# Patient Record
Sex: FEMALE | Race: WHITE | NOT HISPANIC OR LATINO | URBAN - METROPOLITAN AREA
[De-identification: names, ages, dates, MRNs, and addresses within clinical notes are randomized per-mention and may not be internally consistent; named-entity substitution may affect disease eponyms.]

---

## 2023-03-20 PROBLEM — M25.50 ARTHRALGIA OF MULTIPLE SITES: Status: ACTIVE | Noted: 2023-03-20

## 2023-03-20 PROBLEM — E78.5 HYPERLIPIDEMIA: Status: ACTIVE | Noted: 2023-03-20

## 2023-03-20 PROBLEM — F43.10 PTSD (POST-TRAUMATIC STRESS DISORDER): Status: ACTIVE | Noted: 2023-03-20

## 2023-03-20 PROBLEM — S43.006A SHOULDER DISLOCATION: Status: ACTIVE | Noted: 2023-03-20

## 2023-03-20 PROBLEM — M19.90 OSTEOARTHRITIS: Status: ACTIVE | Noted: 2023-03-20

## 2023-03-20 PROBLEM — I34.0 MITRAL INSUFFICIENCY: Status: ACTIVE | Noted: 2023-03-20

## 2023-03-20 PROBLEM — E55.9 VITAMIN D DEFICIENCY: Status: ACTIVE | Noted: 2023-03-20

## 2023-03-20 PROBLEM — R03.0 BLOOD PRESSURE ELEVATED WITHOUT HISTORY OF HTN: Status: ACTIVE | Noted: 2023-03-20

## 2023-03-20 PROBLEM — R05.9 COUGH: Status: ACTIVE | Noted: 2023-03-20

## 2023-03-20 PROBLEM — R07.9 CHEST PAIN: Status: ACTIVE | Noted: 2023-03-20

## 2023-03-20 PROBLEM — R19.7 DIARRHEA: Status: ACTIVE | Noted: 2023-03-20

## 2023-03-20 PROBLEM — H91.90 HEARING DIFFICULTY: Status: ACTIVE | Noted: 2023-03-20

## 2023-03-20 PROBLEM — F41.9 ANXIETY DISORDER: Status: ACTIVE | Noted: 2023-03-20
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
## 2023-03-20 PROBLEM — R01.1 HEART MURMUR: Status: ACTIVE | Noted: 2023-03-20

## 2023-03-20 PROBLEM — M54.9 BACK PAIN: Status: ACTIVE | Noted: 2023-03-20

## 2023-03-20 RX ORDER — BUSPIRONE HYDROCHLORIDE 5 MG/1
1 TABLET ORAL 2 TIMES DAILY
COMMUNITY
Start: 2021-07-07 | End: 2023-09-11 | Stop reason: ALTCHOICE

## 2023-03-20 RX ORDER — HYDROXYZINE HYDROCHLORIDE 10 MG/1
10-20 TABLET, FILM COATED ORAL 2 TIMES DAILY PRN
COMMUNITY
Start: 2021-06-09 | End: 2023-09-11 | Stop reason: ALTCHOICE

## 2023-03-21 ENCOUNTER — APPOINTMENT (OUTPATIENT)
Dept: PRIMARY CARE | Facility: CLINIC | Age: 68
End: 2023-03-21
Payer: MEDICARE

## 2023-03-23 ENCOUNTER — OFFICE VISIT (OUTPATIENT)
Dept: PRIMARY CARE | Facility: CLINIC | Age: 68
End: 2023-03-23
Payer: MEDICARE

## 2023-03-23 VITALS
BODY MASS INDEX: 21.66 KG/M2 | OXYGEN SATURATION: 99 % | TEMPERATURE: 97.4 F | HEART RATE: 65 BPM | HEIGHT: 65 IN | SYSTOLIC BLOOD PRESSURE: 169 MMHG | DIASTOLIC BLOOD PRESSURE: 74 MMHG | RESPIRATION RATE: 14 BRPM | WEIGHT: 130 LBS

## 2023-03-23 DIAGNOSIS — N63.23 MASS OF LOWER OUTER QUADRANT OF LEFT BREAST: Primary | ICD-10-CM

## 2023-03-23 DIAGNOSIS — F41.8 OTHER SPECIFIED ANXIETY DISORDERS: ICD-10-CM

## 2023-03-23 DIAGNOSIS — E55.9 VITAMIN D DEFICIENCY: ICD-10-CM

## 2023-03-23 DIAGNOSIS — R03.0 BLOOD PRESSURE ELEVATED WITHOUT HISTORY OF HTN: ICD-10-CM

## 2023-03-23 DIAGNOSIS — M19.011 OSTEOARTHRITIS OF BOTH SHOULDERS, UNSPECIFIED OSTEOARTHRITIS TYPE: ICD-10-CM

## 2023-03-23 DIAGNOSIS — E78.2 MIXED HYPERLIPIDEMIA: ICD-10-CM

## 2023-03-23 DIAGNOSIS — M19.012 OSTEOARTHRITIS OF BOTH SHOULDERS, UNSPECIFIED OSTEOARTHRITIS TYPE: ICD-10-CM

## 2023-03-23 PROCEDURE — 99215 OFFICE O/P EST HI 40 MIN: CPT | Performed by: INTERNAL MEDICINE

## 2023-03-23 PROCEDURE — 1036F TOBACCO NON-USER: CPT | Performed by: INTERNAL MEDICINE

## 2023-03-23 PROCEDURE — 1160F RVW MEDS BY RX/DR IN RCRD: CPT | Performed by: INTERNAL MEDICINE

## 2023-03-23 PROCEDURE — 1159F MED LIST DOCD IN RCRD: CPT | Performed by: INTERNAL MEDICINE

## 2023-03-23 RX ORDER — ALPRAZOLAM 0.25 MG/1
TABLET ORAL
Qty: 6 TABLET | Refills: 0 | Status: SHIPPED | OUTPATIENT
Start: 2023-03-23 | End: 2023-10-17 | Stop reason: SDUPTHER

## 2023-03-23 ASSESSMENT — ENCOUNTER SYMPTOMS
PALPITATIONS: 0
RESPIRATORY NEGATIVE: 1
SEIZURES: 0
WOUND: 0
SINUS PRESSURE: 0
EYES NEGATIVE: 1
BRUISES/BLEEDS EASILY: 0
VOMITING: 0
UNEXPECTED WEIGHT CHANGE: 0
EYE PAIN: 0
BLOOD IN STOOL: 0
DIFFICULTY URINATING: 0
GASTROINTESTINAL NEGATIVE: 1
SHORTNESS OF BREATH: 0
ALLERGIC/IMMUNOLOGIC NEGATIVE: 1
AGITATION: 0
EYE REDNESS: 0
NEUROLOGICAL NEGATIVE: 1
ACTIVITY CHANGE: 0
SPEECH DIFFICULTY: 0
DYSURIA: 0
BACK PAIN: 0
SINUS PAIN: 0
CONFUSION: 0
CONSTIPATION: 0
EYE DISCHARGE: 0
OCCASIONAL FEELINGS OF UNSTEADINESS: 0
CONSTITUTIONAL NEGATIVE: 1
FLANK PAIN: 0
COLOR CHANGE: 0
POLYDIPSIA: 0
SLEEP DISTURBANCE: 0
NECK STIFFNESS: 0
PSYCHIATRIC NEGATIVE: 1
JOINT SWELLING: 0
FACIAL ASYMMETRY: 0
POLYPHAGIA: 0
DIARRHEA: 0
STRIDOR: 0
TREMORS: 0
ARTHRALGIAS: 0
MUSCULOSKELETAL NEGATIVE: 1
NUMBNESS: 0
HEMATOLOGIC/LYMPHATIC NEGATIVE: 1
WEAKNESS: 0
CHEST TIGHTNESS: 0
WHEEZING: 0
ABDOMINAL PAIN: 0
FREQUENCY: 0
ADENOPATHY: 0
APPETITE CHANGE: 0
DIZZINESS: 0
CARDIOVASCULAR NEGATIVE: 1
NAUSEA: 0
LIGHT-HEADEDNESS: 0
LOSS OF SENSATION IN FEET: 0
NERVOUS/ANXIOUS: 0
ENDOCRINE NEGATIVE: 1
MYALGIAS: 0
SORE THROAT: 0
HALLUCINATIONS: 0
VOICE CHANGE: 0
COUGH: 0
DEPRESSION: 0
NECK PAIN: 0
TROUBLE SWALLOWING: 0
HEADACHES: 0

## 2023-03-23 ASSESSMENT — PATIENT HEALTH QUESTIONNAIRE - PHQ9
SUM OF ALL RESPONSES TO PHQ9 QUESTIONS 1 AND 2: 0
2. FEELING DOWN, DEPRESSED OR HOPELESS: NOT AT ALL
1. LITTLE INTEREST OR PLEASURE IN DOING THINGS: NOT AT ALL

## 2023-03-23 NOTE — PROGRESS NOTES
Subjective   Patient ID: Cydney Lin is a 67 y.o. female who presents for Mass (Patient has a lump underneath her left armpit. ).  HPI    Patient comes for f/up visit today with concern about feeling fullness in left axilla. She noted it few days ago while in the shower. Not tender to palpation. She has fam h/o breast ca, concerned about possibly enlarged lymph nodes.  Her mammogram in Jan 2023 was negative.    Another concern is anxiety and h/o panic attacks, especially when on the plane. She will be flying to [facility] next week and needs to change planes few times. Requests Rx for xanax which she used to take in the past without side effects.  Recently she has been taking buspirone twice daily and hydroxyzine as needed.    We discussed that benzodiazepines use is associated with many risks including dependence, potential for abuse or misuse, excessive sedation, cognitive dysfunction, memory impairment and falls.  I am going to prescribe short supply to cover the travel time.  I reviewed O[facility] report today.    Elevated BP noted today, most likely due to anxiety.   She has h/o white coat HTN, recently not taking any medications, her BP has been well controlled at home.     She follows with ortho regarding shoulder pain and previous surgeries.       Review of Systems   Constitutional: Negative.  Negative for activity change, appetite change and unexpected weight change.   HENT: Negative.  Negative for congestion, ear discharge, ear pain, hearing loss, mouth sores, nosebleeds, sinus pressure, sinus pain, sore throat, trouble swallowing and voice change.    Eyes: Negative.  Negative for pain, discharge, redness and visual disturbance.   Respiratory: Negative.  Negative for cough, chest tightness, shortness of breath, wheezing and stridor.    Cardiovascular: Negative.  Negative for chest pain, palpitations and leg swelling.   Gastrointestinal: Negative.  Negative for abdominal pain, blood in stool, constipation,  "diarrhea, nausea and vomiting.   Endocrine: Negative.  Negative for polydipsia, polyphagia and polyuria.   Genitourinary: Negative.  Negative for difficulty urinating, dysuria, flank pain, frequency and urgency.   Musculoskeletal: Negative.  Negative for arthralgias, back pain, gait problem, joint swelling, myalgias, neck pain and neck stiffness.   Skin: Negative.  Negative for color change, rash and wound.   Allergic/Immunologic: Negative.  Negative for environmental allergies, food allergies and immunocompromised state.   Neurological: Negative.  Negative for dizziness, tremors, seizures, syncope, facial asymmetry, speech difficulty, weakness, light-headedness, numbness and headaches.   Hematological: Negative.  Negative for adenopathy. Does not bruise/bleed easily.   Psychiatric/Behavioral: Negative.  Negative for agitation, behavioral problems, confusion, hallucinations, sleep disturbance and suicidal ideas. The patient is not nervous/anxious.    All other systems reviewed and are negative.      Objective     Review of systems was performed and all systems were negative except what in HPI    /74   Pulse 65   Temp 36.3 °C (97.4 °F)   Resp 14   Ht 1.638 m (5' 4.5\")   Wt 59 kg (130 lb)   SpO2 99%   BMI 21.97 kg/m²    Physical Exam  Vitals and nursing note reviewed.   Constitutional:       General: She is not in acute distress.     Appearance: Normal appearance.   HENT:      Head: Normocephalic and atraumatic.      Right Ear: External ear normal.      Left Ear: External ear normal.      Nose: Nose normal. No congestion or rhinorrhea.   Eyes:      General:         Right eye: No discharge.         Left eye: No discharge.      Extraocular Movements: Extraocular movements intact.      Conjunctiva/sclera: Conjunctivae normal.      Pupils: Pupils are equal, round, and reactive to light.   Cardiovascular:      Rate and Rhythm: Normal rate and regular rhythm.      Pulses: Normal pulses.      Heart sounds: " Normal heart sounds. No murmur heard.     No friction rub. No gallop.   Pulmonary:      Effort: Pulmonary effort is normal. No respiratory distress.      Breath sounds: Normal breath sounds. No stridor. No wheezing, rhonchi or rales.   Chest:      Chest wall: No tenderness.   Abdominal:      General: Bowel sounds are normal.      Palpations: Abdomen is soft. There is no mass.      Tenderness: There is no abdominal tenderness. There is no guarding or rebound.   Musculoskeletal:         General: No swelling or deformity. Normal range of motion.      Cervical back: Normal range of motion and neck supple. No rigidity or tenderness.      Right lower leg: No edema.      Left lower leg: No edema.   Lymphadenopathy:      Cervical: No cervical adenopathy.   Skin:     General: Skin is warm and dry.      Coloration: Skin is not jaundiced.      Findings: No bruising or erythema.      Comments: Breasts symmetric, no nipple discharge or inversion. No masses felt in right breast. Dense tissue/lump? felt in left outer quadrant of left breast.  Lymph nodes: axillary, supra and subclavicular not palpable bilaterally.   Neurological:      General: No focal deficit present.      Mental Status: She is alert and oriented to person, place, and time. Mental status is at baseline.      Cranial Nerves: No cranial nerve deficit.      Motor: No weakness.      Coordination: Coordination normal.      Gait: Gait normal.   Psychiatric:         Mood and Affect: Mood normal.         Behavior: Behavior normal.         Thought Content: Thought content normal.         Judgment: Judgment normal.         Assessment/Plan   Problem List Items Addressed This Visit          Circulatory    Blood pressure elevated without history of HTN     Continue monitoring at home. Let me know if ABDIAS consisitently above 140/90.            Musculoskeletal    Osteoarthritis       Endocrine/Metabolic    Vitamin D deficiency     Continue Vit d supplement.            Other     Anxiety disorder     Continue Buspar twice daily, Hydroxyzine as needed, Xanax sparingly while traveling by air.         Relevant Medications    ALPRAZolam (Xanax) 0.25 mg tablet    Hyperlipidemia     Low cholesterol diet is advised.         Mass of lower outer quadrant of left breast - Primary     Obtain US left breast. Further recommendations after results available.         Relevant Orders    BI US breast complete left     It was a pleasure to see you today.  I would like to remind you about importance of a healthy lifestyle in order to improve your well-being and live longer.  Try to engage in physical activities for at least 150 minutes per week.  Eat about 10 servings of fruits and vegetables daily. My advice is 2 servings of fruits and 8 servings of vegetables.  For vegetables choose at least half of them green and at least half of them fresh.  Please avoid sugar, salt, fried food and saturated fat.    I spent a total of 40 minutes on the date of service which included preparing to see the patient, face-to-face patient care, completing clinical documentation, obtaining and/or reviewing separately obtained history, performing a medically appropriate examination, counseling and educating the patient/family/caregiver, ordering medications, tests, or procedures, communicating with other health care providers (not separately reported), independently interpreting results (not separately reported), communicating results to the patient/family/caregiver, and care coordination (not separately reported).    F/up in 3 months or sooner if needed.

## 2023-03-24 ENCOUNTER — TELEPHONE (OUTPATIENT)
Dept: PRIMARY CARE | Facility: CLINIC | Age: 68
End: 2023-03-24
Payer: MEDICARE

## 2023-04-02 NOTE — RESULT ENCOUNTER NOTE
Your recent breast ultrasound did not show evidence of malignancy.   Please monitor your symptoms, we may consider MRI for further evaluation if concern persists.

## 2023-07-06 ENCOUNTER — APPOINTMENT (OUTPATIENT)
Dept: PRIMARY CARE | Facility: CLINIC | Age: 68
End: 2023-07-06
Payer: MEDICARE

## 2023-07-20 ENCOUNTER — TELEPHONE (OUTPATIENT)
Dept: PRIMARY CARE | Facility: CLINIC | Age: 68
End: 2023-07-20
Payer: MEDICARE

## 2023-07-20 NOTE — TELEPHONE ENCOUNTER
Pt called back did set up appt for tomorrow. She is just worried it will spread to her eye and everything she reads it needs to be address quickly.

## 2023-07-20 NOTE — TELEPHONE ENCOUNTER
Pt called wanting to see Dr. Ambriz, she state she thinks she has shingles, its on her neck a neck and under chin, back of head near hair line.    Pharmacy=  [facility]  [phone]

## 2023-07-21 ENCOUNTER — APPOINTMENT (OUTPATIENT)
Dept: PRIMARY CARE | Facility: CLINIC | Age: 68
End: 2023-07-21
Payer: MEDICARE

## 2023-08-30 ENCOUNTER — PATIENT OUTREACH (OUTPATIENT)
Dept: CARE COORDINATION | Facility: CLINIC | Age: 68
End: 2023-08-30
Payer: MEDICARE

## 2023-08-30 ENCOUNTER — DOCUMENTATION (OUTPATIENT)
Dept: PRIMARY CARE | Facility: CLINIC | Age: 68
End: 2023-08-30
Payer: MEDICARE

## 2023-08-30 RX ORDER — LISINOPRIL 10 MG/1
10 TABLET ORAL DAILY
COMMUNITY
End: 2023-09-11 | Stop reason: SINTOL

## 2023-08-30 RX ORDER — CIPROFLOXACIN 500 MG/1
500 TABLET ORAL 2 TIMES DAILY
COMMUNITY
End: 2023-10-17 | Stop reason: ALTCHOICE

## 2023-08-30 NOTE — PROGRESS NOTES
Discharge Facility:[facility]  Discharge Diagnosis:I16.0 Hypertensive Urgency, N39. UTI  Admission Date:8/28/2023  Discharge Date: 8/29/2023    PCP Appointment Date:TBD  Specialist Appointment Date: TBD Dr. Florian/Cardio/Stress Test  Hospital Encounter and Summary: Linked   See discharge assessment below for further details  Engagement  Call Start Time: 1452 (8/30/2023  2:54 PM)    Medications  Medications reviewed with patient/caregiver?: Yes (Lisinopril 10 mg one qd, Cipro 500 mg one bid x 7 days) (8/30/2023  2:54 PM)  Is the patient having any side effects they believe may be caused by any medication additions or changes?: No (8/30/2023  2:54 PM)  Does the patient have all medications ordered at discharge?: Yes (8/30/2023  2:54 PM)  Care Management Interventions: No intervention needed (8/30/2023  2:54 PM)  Is the patient taking all medications as directed (includes completed medication regime)?: Yes (8/30/2023  2:54 PM)    Appointments  Does the patient have a primary care provider?: Yes (8/30/2023  2:54 PM)  Care Management Interventions: Advised patient to make appointment (8/30/2023  2:54 PM)  Has the patient kept scheduled appointments due by today?: Yes (8/30/2023  2:54 PM)  Care Management Interventions: Advised to schedule with specialist (8/30/2023  2:54 PM)    Patient Teaching  What is the patient's perception of their health status since discharge?: Improving (8/30/2023  2:54 PM)  Is the patient/caregiver able to teach back the hierarchy of who to call/visit for symptoms/problems? PCP, Specialist, Home Health nurse, Urgent Care, ED, 911: Yes (8/30/2023  2:54 PM)    Wrap Up  Wrap Up Additional Comments: -- (Cydney is feeling better but states is kind of wiped out from hospital stay. She is taking meds as directed and is getting new BP monitor and will keep log at home to show provider.) (8/30/2023  2:54 PM)

## 2023-09-11 ENCOUNTER — OFFICE VISIT (OUTPATIENT)
Dept: PRIMARY CARE | Facility: CLINIC | Age: 68
End: 2023-09-11
Payer: MEDICARE

## 2023-09-11 ENCOUNTER — LAB (OUTPATIENT)
Dept: LAB | Facility: LAB | Age: 68
End: 2023-09-11
Payer: MEDICARE

## 2023-09-11 VITALS
BODY MASS INDEX: 21.36 KG/M2 | DIASTOLIC BLOOD PRESSURE: 81 MMHG | HEIGHT: 65 IN | WEIGHT: 128.2 LBS | SYSTOLIC BLOOD PRESSURE: 139 MMHG | TEMPERATURE: 97.9 F | HEART RATE: 76 BPM | RESPIRATION RATE: 16 BRPM | OXYGEN SATURATION: 98 %

## 2023-09-11 DIAGNOSIS — N39.0 URINARY TRACT INFECTION WITHOUT HEMATURIA, SITE UNSPECIFIED: Primary | ICD-10-CM

## 2023-09-11 DIAGNOSIS — E55.9 VITAMIN D DEFICIENCY: ICD-10-CM

## 2023-09-11 DIAGNOSIS — N39.0 URINARY TRACT INFECTION WITHOUT HEMATURIA, SITE UNSPECIFIED: ICD-10-CM

## 2023-09-11 DIAGNOSIS — M19.011 OSTEOARTHRITIS OF BOTH SHOULDERS, UNSPECIFIED OSTEOARTHRITIS TYPE: ICD-10-CM

## 2023-09-11 DIAGNOSIS — E78.2 MIXED HYPERLIPIDEMIA: ICD-10-CM

## 2023-09-11 DIAGNOSIS — I10 PRIMARY HYPERTENSION: ICD-10-CM

## 2023-09-11 DIAGNOSIS — M19.012 OSTEOARTHRITIS OF BOTH SHOULDERS, UNSPECIFIED OSTEOARTHRITIS TYPE: ICD-10-CM

## 2023-09-11 DIAGNOSIS — F41.9 ANXIETY DISORDER, UNSPECIFIED TYPE: ICD-10-CM

## 2023-09-11 LAB
APPEARANCE, URINE: CLEAR
BACTERIA, URINE: ABNORMAL /HPF
BILIRUBIN, URINE: NEGATIVE
BLOOD, URINE: ABNORMAL
COLOR, URINE: ABNORMAL
GLUCOSE, URINE: NEGATIVE MG/DL
KETONES, URINE: NEGATIVE MG/DL
LEUKOCYTE ESTERASE, URINE: ABNORMAL
NITRITE, URINE: NEGATIVE
PH, URINE: 6 (ref 5–8)
PROTEIN, URINE: NEGATIVE MG/DL
RBC, URINE: 1 /HPF (ref 0–5)
SPECIFIC GRAVITY, URINE: 1 (ref 1–1.03)
SQUAMOUS EPITHELIAL CELLS, URINE: 1 /HPF
UROBILINOGEN, URINE: <2 MG/DL (ref 0–1.9)
WBC, URINE: 3 /HPF (ref 0–5)

## 2023-09-11 PROCEDURE — 3075F SYST BP GE 130 - 139MM HG: CPT | Performed by: INTERNAL MEDICINE

## 2023-09-11 PROCEDURE — 1160F RVW MEDS BY RX/DR IN RCRD: CPT | Performed by: INTERNAL MEDICINE

## 2023-09-11 PROCEDURE — 1159F MED LIST DOCD IN RCRD: CPT | Performed by: INTERNAL MEDICINE

## 2023-09-11 PROCEDURE — 99495 TRANSJ CARE MGMT MOD F2F 14D: CPT | Performed by: INTERNAL MEDICINE

## 2023-09-11 PROCEDURE — 1036F TOBACCO NON-USER: CPT | Performed by: INTERNAL MEDICINE

## 2023-09-11 PROCEDURE — 87086 URINE CULTURE/COLONY COUNT: CPT

## 2023-09-11 PROCEDURE — 81001 URINALYSIS AUTO W/SCOPE: CPT

## 2023-09-11 PROCEDURE — 3079F DIAST BP 80-89 MM HG: CPT | Performed by: INTERNAL MEDICINE

## 2023-09-11 RX ORDER — ALPRAZOLAM 0.25 MG/1
0.25 TABLET ORAL 3 TIMES DAILY PRN
Qty: 6 TABLET | Refills: 0 | Status: SHIPPED | OUTPATIENT
Start: 2023-09-11 | End: 2023-10-24 | Stop reason: ALTCHOICE

## 2023-09-11 RX ORDER — LOSARTAN POTASSIUM 100 MG/1
100 TABLET ORAL DAILY
Qty: 30 TABLET | Refills: 5 | Status: SHIPPED | OUTPATIENT
Start: 2023-09-11 | End: 2023-11-10 | Stop reason: SDUPTHER

## 2023-09-11 ASSESSMENT — PATIENT HEALTH QUESTIONNAIRE - PHQ9
2. FEELING DOWN, DEPRESSED OR HOPELESS: NOT AT ALL
SUM OF ALL RESPONSES TO PHQ9 QUESTIONS 1 AND 2: 0
1. LITTLE INTEREST OR PLEASURE IN DOING THINGS: NOT AT ALL

## 2023-09-11 ASSESSMENT — ENCOUNTER SYMPTOMS
NECK STIFFNESS: 0
NECK PAIN: 0
POLYPHAGIA: 0
VOMITING: 0
ARTHRALGIAS: 1
APPETITE CHANGE: 0
DIZZINESS: 0
TROUBLE SWALLOWING: 0
WOUND: 0
SEIZURES: 0
RESPIRATORY NEGATIVE: 1
SINUS PAIN: 0
CONSTIPATION: 0
JOINT SWELLING: 0
CARDIOVASCULAR NEGATIVE: 1
ABDOMINAL PAIN: 0
NAUSEA: 0
SLEEP DISTURBANCE: 0
EYE REDNESS: 0
ADENOPATHY: 0
VOICE CHANGE: 0
SORE THROAT: 0
FREQUENCY: 1
LOSS OF SENSATION IN FEET: 0
ENDOCRINE NEGATIVE: 1
COUGH: 0
BLOOD IN STOOL: 0
HEADACHES: 0
SHORTNESS OF BREATH: 0
PALPITATIONS: 0
EYES NEGATIVE: 1
NEUROLOGICAL NEGATIVE: 1
BACK PAIN: 0
DIFFICULTY URINATING: 0
DEPRESSION: 0
CONSTITUTIONAL NEGATIVE: 1
OCCASIONAL FEELINGS OF UNSTEADINESS: 0
NUMBNESS: 0
COLOR CHANGE: 0
STRIDOR: 0
DYSURIA: 0
FLANK PAIN: 0
MYALGIAS: 0
BRUISES/BLEEDS EASILY: 0
SPEECH DIFFICULTY: 0
UNEXPECTED WEIGHT CHANGE: 0
GASTROINTESTINAL NEGATIVE: 1
ACTIVITY CHANGE: 0
TREMORS: 0
LIGHT-HEADEDNESS: 0
SINUS PRESSURE: 0
EYE PAIN: 0
WEAKNESS: 0
ALLERGIC/IMMUNOLOGIC NEGATIVE: 1
POLYDIPSIA: 0
CONFUSION: 0
HEMATOLOGIC/LYMPHATIC NEGATIVE: 1
AGITATION: 0
EYE DISCHARGE: 0
CHEST TIGHTNESS: 0
WHEEZING: 0
NERVOUS/ANXIOUS: 1
DIARRHEA: 0
HALLUCINATIONS: 0
FACIAL ASYMMETRY: 0

## 2023-09-11 NOTE — PROGRESS NOTES
Subjective   Patient ID: Cydney Lin is a 67 y.o. female who presents for Hospital Follow-up (Patient is here for a TCM visit. ).  HPI    Patient comes for TCM visit.   Hospitalized at [facility] from 9/28/23   to  8/29/23  with c/o  chest pain, left arm numbness .  Diagnosed with  Hypertensive urgency, UTI.  Ruled out for ACS.                               .    I reviewed available hospital records and discharge orders. Discussed hospital course with patient in details. I reviewed discharge medications, reconciled discharge medications and compared with outpatient medication list. All medications changes were discussed with patient in details. Current medication list was updated to reflect recent changes.   Advised to follow up with PCP, cardiology (Dr. Florian), and to schedule stress test.    Discharged home without  home care.    Patient was contacted on 8/30/2023. Spoke with TCM team.   Complexity of visit: moderate    Patient gets anxious very easily, only xanax helps her relax, requesting Rx, I gave patient short supply and will refer to psychiatrist.    Patient has bene taking tramadol prn prescribed by ortho, we discussed that she should not take both on same day, patient aware.    Elevated blood pressure noted on arrival. Decreased after resting in the office. I personally rechecked patient's blood pressure.     Lisinopril has been giving her cough, will discontinue and start Losartan.    Patient has been c/o frequent urination, no dysuria, completed Cipro.        Review of Systems   Constitutional: Negative.  Negative for activity change, appetite change and unexpected weight change.   HENT: Negative.  Negative for congestion, ear discharge, ear pain, hearing loss, mouth sores, nosebleeds, sinus pressure, sinus pain, sore throat, trouble swallowing and voice change.    Eyes: Negative.  Negative for pain, discharge, redness and visual disturbance.   Respiratory: Negative.  Negative for cough, chest  "tightness, shortness of breath, wheezing and stridor.    Cardiovascular: Negative.  Negative for chest pain, palpitations and leg swelling.   Gastrointestinal: Negative.  Negative for abdominal pain, blood in stool, constipation, diarrhea, nausea and vomiting.   Endocrine: Negative.  Negative for polydipsia, polyphagia and polyuria.   Genitourinary:  Positive for frequency. Negative for difficulty urinating, dysuria, flank pain and urgency.   Musculoskeletal:  Positive for arthralgias. Negative for back pain, gait problem, joint swelling, myalgias, neck pain and neck stiffness.   Skin: Negative.  Negative for color change, rash and wound.   Allergic/Immunologic: Negative.  Negative for environmental allergies, food allergies and immunocompromised state.   Neurological: Negative.  Negative for dizziness, tremors, seizures, syncope, facial asymmetry, speech difficulty, weakness, light-headedness, numbness and headaches.   Hematological: Negative.  Negative for adenopathy. Does not bruise/bleed easily.   Psychiatric/Behavioral:  Negative for agitation, behavioral problems, confusion, hallucinations, sleep disturbance and suicidal ideas. The patient is nervous/anxious.    All other systems reviewed and are negative.      Objective     Review of systems was performed and all systems were negative except what in HPI    /81   Pulse 76   Temp 36.6 °C (97.9 °F)   Resp 16   Ht 1.638 m (5' 4.5\")   Wt 58.2 kg (128 lb 3.2 oz)   SpO2 98%   BMI 21.67 kg/m²    Physical Exam  Vitals and nursing note reviewed.   Constitutional:       General: She is not in acute distress.     Appearance: Normal appearance.   HENT:      Head: Normocephalic and atraumatic.      Right Ear: External ear normal.      Left Ear: External ear normal.      Nose: Nose normal. No congestion or rhinorrhea.   Eyes:      General:         Right eye: No discharge.         Left eye: No discharge.      Extraocular Movements: Extraocular movements intact. "      Conjunctiva/sclera: Conjunctivae normal.      Pupils: Pupils are equal, round, and reactive to light.   Cardiovascular:      Rate and Rhythm: Normal rate and regular rhythm.      Pulses: Normal pulses.      Heart sounds: Normal heart sounds. No murmur heard.     No friction rub. No gallop.   Pulmonary:      Effort: Pulmonary effort is normal. No respiratory distress.      Breath sounds: Normal breath sounds. No stridor. No wheezing, rhonchi or rales.   Chest:      Chest wall: No tenderness.   Abdominal:      General: Bowel sounds are normal.      Palpations: Abdomen is soft. There is no mass.      Tenderness: There is no abdominal tenderness. There is no guarding or rebound.   Musculoskeletal:         General: No swelling or deformity. Normal range of motion.      Cervical back: Normal range of motion and neck supple. No rigidity or tenderness.      Right lower leg: No edema.      Left lower leg: No edema.   Lymphadenopathy:      Cervical: No cervical adenopathy.   Skin:     General: Skin is warm and dry.      Coloration: Skin is not jaundiced.      Findings: No bruising or erythema.   Neurological:      General: No focal deficit present.      Mental Status: She is alert and oriented to person, place, and time. Mental status is at baseline.      Cranial Nerves: No cranial nerve deficit.      Motor: No weakness.      Coordination: Coordination normal.      Gait: Gait normal.   Psychiatric:         Mood and Affect: Mood normal.         Behavior: Behavior normal.         Thought Content: Thought content normal.         Judgment: Judgment normal.         Assessment/Plan   Problem List Items Addressed This Visit          Cardiac and Vasculature    Hyperlipidemia     Low cholesterol diet is advised. F/up with cardiologist.         Primary hypertension     Start Losartan 100 mg daily, monitor BP at home, let me know if consistently above 140/90.         Relevant Medications    losartan (Cozaar) 100 mg tablet        Endocrine/Metabolic    Vitamin D deficiency     Continue Vit d supplement.            Genitourinary and Reproductive    Urinary tract infection without hematuria - Primary     Obtain UA and U C+S.         Relevant Orders    Urinalysis with Reflex Microscopic (Completed)    Urine Culture       Mental Health    Anxiety disorder     Tried  Buspar twice daily, Hydroxyzine as needed but not effective.  Use Xanax sparingly as discussed.  Consult psychiatrist.         Relevant Medications    ALPRAZolam (Xanax) 0.25 mg tablet    Other Relevant Orders    Referral to Psychiatry       Musculoskeletal and Injuries    Osteoarthritis     F/up with ortho.           It was a pleasure to see you today.  I would like to remind you about importance of a healthy lifestyle in order to improve your well-being and live longer.  Try to engage in physical activities for at least 150 minutes per week.  Eat about 10 servings of fruits and vegetables daily. My advice is 2 servings of fruits and 8 servings of vegetables.  For vegetables choose at least half of them green and at least half of them fresh.  Please avoid sugar, salt, fried food and saturated fat.      F/up in 1 month or sooner if needed.

## 2023-09-12 LAB — URINE CULTURE: NORMAL

## 2023-09-12 NOTE — ASSESSMENT & PLAN NOTE
Tried  Buspar twice daily, Hydroxyzine as needed but not effective.  Use Xanax sparingly as discussed.  Consult psychiatrist.

## 2023-09-13 ENCOUNTER — PATIENT OUTREACH (OUTPATIENT)
Dept: CARE COORDINATION | Facility: CLINIC | Age: 68
End: 2023-09-13
Payer: MEDICARE

## 2023-09-13 NOTE — PROGRESS NOTES
Unable to reach patient for call back after patient's follow up appointment with PCP.   JOHNNIEM with call back number for patient to call if needed   If no voicemail available call attempts x 2 were made to contact the patient to assist with any questions or concerns patient may have.

## 2023-10-13 PROCEDURE — 87086 URINE CULTURE/COLONY COUNT: CPT

## 2023-10-14 ENCOUNTER — LAB REQUISITION (OUTPATIENT)
Dept: LAB | Facility: HOSPITAL | Age: 68
End: 2023-10-14
Payer: MEDICARE

## 2023-10-15 LAB — BACTERIA UR CULT: NORMAL

## 2023-10-17 ENCOUNTER — OFFICE VISIT (OUTPATIENT)
Dept: PRIMARY CARE | Facility: CLINIC | Age: 68
End: 2023-10-17
Payer: MEDICARE

## 2023-10-17 VITALS
TEMPERATURE: 97.6 F | RESPIRATION RATE: 16 BRPM | SYSTOLIC BLOOD PRESSURE: 108 MMHG | BODY MASS INDEX: 21.26 KG/M2 | WEIGHT: 127.6 LBS | HEART RATE: 64 BPM | DIASTOLIC BLOOD PRESSURE: 61 MMHG | OXYGEN SATURATION: 98 % | HEIGHT: 65 IN

## 2023-10-17 DIAGNOSIS — I10 PRIMARY HYPERTENSION: ICD-10-CM

## 2023-10-17 DIAGNOSIS — E78.2 MIXED HYPERLIPIDEMIA: ICD-10-CM

## 2023-10-17 DIAGNOSIS — N39.0 URINARY TRACT INFECTION WITHOUT HEMATURIA, SITE UNSPECIFIED: ICD-10-CM

## 2023-10-17 DIAGNOSIS — M19.011 OSTEOARTHRITIS OF BOTH SHOULDERS, UNSPECIFIED OSTEOARTHRITIS TYPE: ICD-10-CM

## 2023-10-17 DIAGNOSIS — R05.9 COUGH, UNSPECIFIED TYPE: ICD-10-CM

## 2023-10-17 DIAGNOSIS — F41.9 ANXIETY DISORDER, UNSPECIFIED TYPE: ICD-10-CM

## 2023-10-17 DIAGNOSIS — Z12.31 BREAST CANCER SCREENING BY MAMMOGRAM: Primary | ICD-10-CM

## 2023-10-17 DIAGNOSIS — E55.9 VITAMIN D DEFICIENCY: ICD-10-CM

## 2023-10-17 DIAGNOSIS — M19.012 OSTEOARTHRITIS OF BOTH SHOULDERS, UNSPECIFIED OSTEOARTHRITIS TYPE: ICD-10-CM

## 2023-10-17 PROCEDURE — 1160F RVW MEDS BY RX/DR IN RCRD: CPT | Performed by: INTERNAL MEDICINE

## 2023-10-17 PROCEDURE — 1036F TOBACCO NON-USER: CPT | Performed by: INTERNAL MEDICINE

## 2023-10-17 PROCEDURE — 3074F SYST BP LT 130 MM HG: CPT | Performed by: INTERNAL MEDICINE

## 2023-10-17 PROCEDURE — 3078F DIAST BP <80 MM HG: CPT | Performed by: INTERNAL MEDICINE

## 2023-10-17 PROCEDURE — 99214 OFFICE O/P EST MOD 30 MIN: CPT | Performed by: INTERNAL MEDICINE

## 2023-10-17 PROCEDURE — 1159F MED LIST DOCD IN RCRD: CPT | Performed by: INTERNAL MEDICINE

## 2023-10-17 ASSESSMENT — ENCOUNTER SYMPTOMS
ADENOPATHY: 0
NUMBNESS: 0
MYALGIAS: 0
CONFUSION: 0
ARTHRALGIAS: 1
DIARRHEA: 0
STRIDOR: 0
ACTIVITY CHANGE: 0
EYE REDNESS: 0
SINUS PAIN: 0
POLYPHAGIA: 0
AGITATION: 0
BLOOD IN STOOL: 0
SLEEP DISTURBANCE: 0
DIZZINESS: 0
LOSS OF SENSATION IN FEET: 0
EYES NEGATIVE: 1
ALLERGIC/IMMUNOLOGIC NEGATIVE: 1
NERVOUS/ANXIOUS: 0
VOICE CHANGE: 0
CARDIOVASCULAR NEGATIVE: 1
DIFFICULTY URINATING: 0
SORE THROAT: 0
SPEECH DIFFICULTY: 0
RESPIRATORY NEGATIVE: 1
SEIZURES: 0
DEPRESSION: 0
SINUS PRESSURE: 0
PSYCHIATRIC NEGATIVE: 1
SHORTNESS OF BREATH: 0
HALLUCINATIONS: 0
GASTROINTESTINAL NEGATIVE: 1
JOINT SWELLING: 0
HEMATOLOGIC/LYMPHATIC NEGATIVE: 1
OCCASIONAL FEELINGS OF UNSTEADINESS: 0
FREQUENCY: 0
HEADACHES: 0
LIGHT-HEADEDNESS: 0
BACK PAIN: 0
PALPITATIONS: 0
FACIAL ASYMMETRY: 0
NECK STIFFNESS: 0
TREMORS: 0
POLYDIPSIA: 0
TROUBLE SWALLOWING: 0
NECK PAIN: 0
UNEXPECTED WEIGHT CHANGE: 0
APPETITE CHANGE: 0
EYE PAIN: 0
COUGH: 0
DYSURIA: 0
COLOR CHANGE: 0
NEUROLOGICAL NEGATIVE: 1
CONSTIPATION: 0
CONSTITUTIONAL NEGATIVE: 1
VOMITING: 0
WEAKNESS: 0
EYE DISCHARGE: 0
CHEST TIGHTNESS: 0
NAUSEA: 0
BRUISES/BLEEDS EASILY: 0
ENDOCRINE NEGATIVE: 1
WHEEZING: 0
FLANK PAIN: 0
WOUND: 0
ABDOMINAL PAIN: 0

## 2023-10-17 NOTE — PROGRESS NOTES
Subjective   Patient ID: Cydney Lin is a 67 y.o. female who presents for Follow-up (Patient is here for a 1 month follow up./No new concerns. ).  HPI    Patient has been feeling pretty good and has been complaint with prescribed medications.  Cough significantly improved after lisinopril changed to Losartan.  She has bene monitoring her BP at home and it has been below 140/90 most of the times.    We reviewed and discussed details of recent blood work: CBC, CMP, TSH, Lipid panel, Hb A1c, Vit D done in 2023,   Results within acceptable range except elevated cholesterol. And low Vit d.    CT CAC score in 2021: zero  Echocardiogram in August 2023 showed hyperdynamic LV and elevated transaortic valve velocity.  She will se cardiologist Dr. Florian soon.     Patient had UTI sx last Friday, went to Urgent Care, diagnosed with UTI, advised antibiotic.  We reviewed urine cultures which were negative, I advised to stop antibiotic.       Mammogram: 1/2023  Colonoscopy: 12/2021            Review of Systems   Constitutional: Negative.  Negative for activity change, appetite change and unexpected weight change.   HENT: Negative.  Negative for congestion, ear discharge, ear pain, hearing loss, mouth sores, nosebleeds, sinus pressure, sinus pain, sore throat, trouble swallowing and voice change.    Eyes: Negative.  Negative for pain, discharge, redness and visual disturbance.   Respiratory: Negative.  Negative for cough, chest tightness, shortness of breath, wheezing and stridor.    Cardiovascular: Negative.  Negative for chest pain, palpitations and leg swelling.   Gastrointestinal: Negative.  Negative for abdominal pain, blood in stool, constipation, diarrhea, nausea and vomiting.   Endocrine: Negative.  Negative for polydipsia, polyphagia and polyuria.   Genitourinary: Negative.  Negative for difficulty urinating, dysuria, flank pain, frequency and urgency.   Musculoskeletal:  Positive for arthralgias. Negative for back  "pain, gait problem, joint swelling, myalgias, neck pain and neck stiffness.   Skin: Negative.  Negative for color change, rash and wound.   Allergic/Immunologic: Negative.  Negative for environmental allergies, food allergies and immunocompromised state.   Neurological: Negative.  Negative for dizziness, tremors, seizures, syncope, facial asymmetry, speech difficulty, weakness, light-headedness, numbness and headaches.   Hematological: Negative.  Negative for adenopathy. Does not bruise/bleed easily.   Psychiatric/Behavioral: Negative.  Negative for agitation, behavioral problems, confusion, hallucinations, sleep disturbance and suicidal ideas. The patient is not nervous/anxious.    All other systems reviewed and are negative.      Objective     Review of systems was performed and all systems were negative except what in HPI    /61   Pulse 64   Temp 36.4 °C (97.6 °F)   Resp 16   Ht 1.638 m (5' 4.5\")   Wt 57.9 kg (127 lb 9.6 oz)   SpO2 98%   BMI 21.56 kg/m²    Physical Exam  Vitals and nursing note reviewed.   Constitutional:       General: She is not in acute distress.     Appearance: Normal appearance.   HENT:      Head: Normocephalic and atraumatic.      Right Ear: External ear normal.      Left Ear: External ear normal.      Nose: Nose normal. No congestion or rhinorrhea.   Eyes:      General:         Right eye: No discharge.         Left eye: No discharge.      Extraocular Movements: Extraocular movements intact.      Conjunctiva/sclera: Conjunctivae normal.      Pupils: Pupils are equal, round, and reactive to light.   Cardiovascular:      Rate and Rhythm: Normal rate and regular rhythm.      Pulses: Normal pulses.      Heart sounds: Normal heart sounds. No murmur heard.     No friction rub. No gallop.   Pulmonary:      Effort: Pulmonary effort is normal. No respiratory distress.      Breath sounds: Normal breath sounds. No stridor. No wheezing, rhonchi or rales.   Chest:      Chest wall: No " tenderness.   Abdominal:      General: Bowel sounds are normal.      Palpations: Abdomen is soft. There is no mass.      Tenderness: There is no abdominal tenderness. There is no guarding or rebound.   Musculoskeletal:         General: No swelling or deformity. Normal range of motion.      Cervical back: Normal range of motion and neck supple. No rigidity or tenderness.      Right lower leg: No edema.      Left lower leg: No edema.   Lymphadenopathy:      Cervical: No cervical adenopathy.   Skin:     General: Skin is warm and dry.      Coloration: Skin is not jaundiced.      Findings: No bruising or erythema.   Neurological:      General: No focal deficit present.      Mental Status: She is alert and oriented to person, place, and time. Mental status is at baseline.      Cranial Nerves: No cranial nerve deficit.      Motor: No weakness.      Coordination: Coordination normal.      Gait: Gait normal.   Psychiatric:         Mood and Affect: Mood normal.         Behavior: Behavior normal.         Thought Content: Thought content normal.         Judgment: Judgment normal.         Assessment/Plan   Problem List Items Addressed This Visit             ICD-10-CM       Cardiac and Vasculature    Hyperlipidemia E78.5     Low cholesterol diet is advised. F/up with cardiologist.  CT CAC score in 2021: zero.         Primary hypertension I10     Continue  Losartan 100 mg daily, monitor BP at home, let me know if consistently above 140/90.            Endocrine/Metabolic    Vitamin D deficiency E55.9     Continue Vit d supplement.            Genitourinary and Reproductive    Urinary tract infection without hematuria N39.0     Recent U C+S negative (October 2023)            Mental Health    Anxiety disorder F41.9     Tried  Buspar twice daily, Hydroxyzine as needed but not effective.  Use Xanax sparingly as discussed.  Consult psychiatrist.            Musculoskeletal and Injuries    Osteoarthritis M19.90     F/up with ortho.              Pulmonary and Pneumonias    Cough R05.9     Cough improved after ACE-inh changed to ARB.          Other Visit Diagnoses         Codes    Breast cancer screening by mammogram    -  Primary Z12.31    Relevant Orders    BI mammo bilateral screening tomosynthesis        It was a pleasure to see you today.  I would like to remind you about importance of a healthy lifestyle in order to improve your well-being and live longer.  Try to engage in physical activities for at least 150 minutes per week.  Eat about 10 servings of fruits and vegetables daily. My advice is 2 servings of fruits and 8 servings of vegetables.  For vegetables choose at least half of them green and at least half of them fresh.  Please avoid sugar, salt, fried food and saturated fat.    I spent a total of 30 minutes on the date of service for follow up visit, which included preparing to see the patient, face-to-face patient care, completing clinical documentation, obtaining and/or reviewing separately obtained history, performing a medically appropriate examination, counseling and educating the patient/family/caregiver, ordering medications, tests, or procedures, communicating with other health care providers (not separately reported), independently interpreting results (not separately reported), communicating results to the patient/family/caregiver, and care coordination (not separately reported).    F/up in 3 months or sooner if needed.

## 2023-10-24 ENCOUNTER — OFFICE VISIT (OUTPATIENT)
Dept: CARDIOLOGY | Facility: CLINIC | Age: 68
End: 2023-10-24
Payer: MEDICARE

## 2023-10-24 VITALS
WEIGHT: 126 LBS | BODY MASS INDEX: 20.99 KG/M2 | DIASTOLIC BLOOD PRESSURE: 80 MMHG | HEIGHT: 65 IN | HEART RATE: 49 BPM | SYSTOLIC BLOOD PRESSURE: 150 MMHG

## 2023-10-24 DIAGNOSIS — E78.5 HYPERLIPIDEMIA, UNSPECIFIED HYPERLIPIDEMIA TYPE: ICD-10-CM

## 2023-10-24 DIAGNOSIS — I10 ESSENTIAL HYPERTENSION: ICD-10-CM

## 2023-10-24 DIAGNOSIS — I10 PRIMARY HYPERTENSION: ICD-10-CM

## 2023-10-24 DIAGNOSIS — R07.89 ATYPICAL CHEST PAIN: Primary | ICD-10-CM

## 2023-10-24 PROCEDURE — 1036F TOBACCO NON-USER: CPT | Performed by: INTERNAL MEDICINE

## 2023-10-24 PROCEDURE — 1160F RVW MEDS BY RX/DR IN RCRD: CPT | Performed by: INTERNAL MEDICINE

## 2023-10-24 PROCEDURE — 99204 OFFICE O/P NEW MOD 45 MIN: CPT | Performed by: INTERNAL MEDICINE

## 2023-10-24 PROCEDURE — 3079F DIAST BP 80-89 MM HG: CPT | Performed by: INTERNAL MEDICINE

## 2023-10-24 PROCEDURE — 3077F SYST BP >= 140 MM HG: CPT | Performed by: INTERNAL MEDICINE

## 2023-10-24 PROCEDURE — 93000 ELECTROCARDIOGRAM COMPLETE: CPT | Performed by: INTERNAL MEDICINE

## 2023-10-24 PROCEDURE — 1159F MED LIST DOCD IN RCRD: CPT | Performed by: INTERNAL MEDICINE

## 2023-10-24 NOTE — PROGRESS NOTES
Referred by Dr. NASIM Montiel for Please see below.     History Of Present Illness:    Cydney Lin is a 67 y.o. female presenting with chest pain.    This hypertensive, hyperlipidemic 67 year old woman has had episodic chest pain.  Her first episode of chest discomfort dates back to perhaps 15-20 years ago.  She has a history of PTSD and anxiety.  She is previously  and has had stresses throughout her life.  Fifteen to twenty years ago, she experienced chest discomfort similar to her current symptoms.  She awakens in the middle of the night and experiences mid sternal discomfort and numbness in her left arm.  She went to the hospital in August 2023 with chest discomfort.  These symptoms lasted for six hours before she went to the hospital.  Her troponins were normal.  Her echo disclosed an EF of 70-75%, without valvular disease.  She goes to the gym twice a week, and feels well when she exercises.  Her prior stress test in January 2023 was negative for ischemia at 12.5 METS.  Her coronary artery calcium score in 2021 was 0.      Past Medical History:  She has a past medical history of Hyperlipidemia and Personal history of urinary calculi (06/09/2021).    Past Surgical History:  She has a past surgical history that includes Other surgical history (06/09/2021) and Other surgical history (06/09/2021).      Social History:  She reports that she has never smoked. She has never been exposed to tobacco smoke. She has never used smokeless tobacco. She reports current alcohol use. She reports current drug use. Drug: Marijuana.    Family History:  Family History   Problem Relation Name Age of Onset    Colon cancer Mother  85    Multiple sclerosis Father      Stroke Maternal Grandmother          Allergies:  Lisinopril and Vicodin [hydrocodone-acetaminophen]    Outpatient Medications:  Current Outpatient Medications   Medication Instructions    losartan (COZAAR) 100 mg, oral, Daily        Last Recorded  "Vitals:  Vitals:    10/24/23 1508   BP: 150/80   BP Location: Left arm   Patient Position: Sitting   BP Cuff Size: Adult   Pulse: (!) 49   Weight: 57.2 kg (126 lb)   Height: 1.638 m (5' 4.5\")       Physical Exam:  GENERAL:  pleasant 67 year-old  HEENT: No xanthelasma  NECK: Supple, no palpable adenopathy or thyromegaly  CHEST: Clear to auscultation, respiratory effort unlabored  CARDIAC: RRR, normal S1 and S2, no audible murmur, rub, gallop, carotids are brisk, PMI is not displaced  ABD: Active bowel sounds, nontender, no organomegaly, no evidence of ascites  EXT: No clubbing, cyanosis, edema, or tenderness  NEURO: Awake, alert, appropriate, speech is fluent         Last Labs:  CBC -  Lab Results   Component Value Date    WBC 6.5 08/28/2023    HGB 14.6 08/28/2023    HCT 44.4 08/28/2023    MCV 88 08/28/2023     08/28/2023       CMP -  Lab Results   Component Value Date    CALCIUM 9.5 08/28/2023    PROT 6.2 (L) 08/29/2023    ALBUMIN 3.8 08/29/2023    AST 24 08/29/2023    ALT 46 (H) 08/29/2023    ALKPHOS 87 08/29/2023    BILITOT 0.6 08/29/2023       LIPID PANEL -   Lab Results   Component Value Date    CHOL 253 (H) 08/28/2023    TRIG 103 08/28/2023    HDL 86.6 08/28/2023    CHHDL 2.9 08/28/2023    LDLF 146 (H) 08/28/2023    VLDL 21 08/28/2023       RENAL FUNCTION PANEL -   Lab Results   Component Value Date    GLUCOSE 95 08/28/2023     08/28/2023    K 4.5 08/28/2023     08/28/2023    CO2 23 08/28/2023    ANIONGAP 16 08/28/2023    BUN 12 08/28/2023    CREATININE 0.66 08/28/2023    CALCIUM 9.5 08/28/2023    ALBUMIN 3.8 08/29/2023        Lab Results   Component Value Date    HGBA1C 5.4 08/28/2023         Lab review: I have Chemistry CMP:   Lab Results   Component Value Date    ALBUMIN 3.8 08/29/2023    CALCIUM 9.5 08/28/2023    CO2 23 08/28/2023    CREATININE 0.66 08/28/2023    GLUCOSE 95 08/28/2023    BILITOT 0.6 08/29/2023    PROT 6.2 (L) 08/29/2023    ALT 46 (H) 08/29/2023    AST 24 08/29/2023    " "ALKPHOS 87 08/29/2023   , Chemistry BMP   Lab Results   Component Value Date    GLUCOSE 95 08/28/2023    CALCIUM 9.5 08/28/2023    CO2 23 08/28/2023    CREATININE 0.66 08/28/2023   , CBC:  Lab Results   Component Value Date    WBC 6.5 08/28/2023    RBC 5.07 08/28/2023    HGB 14.6 08/28/2023    HCT 44.4 08/28/2023    MCV 88 08/28/2023    MCHC 32.9 08/28/2023    RDW 12.9 08/28/2023    NRBC 0.0 08/28/2023   , Lipids:   Lab Results   Component Value Date    CHOL 253 (H) 08/28/2023    HDL 86.6 08/28/2023    TRIG 103 08/28/2023   , and Cardiac Enzymes: No results found for: \"CKTOTAL\", \"CKMB\", \"CKMBINDEX\", \"TROPONINT\"  Diagnostic review: I have personally reviewed the result(s) of the Echocardiogram . Coronary artery calcium score 2021, stress test January 2023.    Assessment/Plan   Problem List Items Addressed This Visit          Cardiac and Vasculature    Hyperlipidemia    Primary hypertension       Symptoms and Signs    Atypical chest pain - Primary    Relevant Orders    ECG 12 Lead (Completed)     Other Visit Diagnoses       Essential hypertension              This 67-year-old hypertensive, hyperlipidemic woman has atypical chest pain as described.  Her stress test in January 2023 was negative for ischemia at 12.5 METS.  A coronary artery calcium score in April 2021 was 0.  An echocardiogram done during a hospitalization in August 2023 with atypical chest pain disclosed an ejection fraction of 70 to 75% with no significant valvular or pericardial disease.  Her exam discloses a soft flow murmur in the aortic area likely due to hypercontractility.  Her projected 10-year event rate according to [employer] with calcium score is 2.8%.  I reassured her that no additional testing is necessary at present.  Important aspects of primary prevention were discussed in detail.    Cristino Florian MD  "

## 2023-10-24 NOTE — PATIENT INSTRUCTIONS
"You should increase your intake of fresh fruits and vegetables.  Try to consume 9-12 servings per day of such foods.  You should increase your intake of deep sea fish such as salmon and tuna.  Try to get two servings per week of fish, but if you are a pregnant woman, talk to your obstetrician before increasing your fish intake.  You should increase your intake of unprocessed nuts such as walnuts or almonds.  Increase your intake of plant-based protein.  You should avoid fried foods.  Don't consume sugary or starchy foods and sugary drinks.  Avoid saturated fats.  Try not to dine at restaurants more than once per month, and don't dine at fast food places.  Try to get 7-9 hours of sleep every night.  Try to get 150 minutes per week of moderate intensity exercise (after I have cleared you to start an exercise program).  Try to maintain the appropriate weight for your height based on body mass index (BMI). Maintain your cholesterol, blood sugar, and blood pressure in the recommended respective normal ranges.  There is a wealth of information on the American [employer] Association's website regarding this.  Just Google \"Life's Essential 8\" for more information.   Ask me about any of these details  if you have questions.    As your cardiologist, I will be available to you at any time to answer any question you have concerning your heart health.  My staff, Amanda can also answer any questions you may have.  Best of luck.   "

## 2023-10-26 ENCOUNTER — APPOINTMENT (OUTPATIENT)
Dept: PRIMARY CARE | Facility: CLINIC | Age: 68
End: 2023-10-26
Payer: MEDICARE

## 2023-11-09 ENCOUNTER — OFFICE VISIT (OUTPATIENT)
Dept: UROLOGY | Facility: CLINIC | Age: 68
End: 2023-11-09
Payer: MEDICARE

## 2023-11-09 VITALS — SYSTOLIC BLOOD PRESSURE: 176 MMHG | TEMPERATURE: 97.1 F | DIASTOLIC BLOOD PRESSURE: 80 MMHG | HEART RATE: 62 BPM

## 2023-11-09 DIAGNOSIS — B37.31 YEAST INFECTION OF THE VAGINA: ICD-10-CM

## 2023-11-09 DIAGNOSIS — R30.0 DYSURIA: Primary | ICD-10-CM

## 2023-11-09 DIAGNOSIS — Q64.70 ABNORMALITY OF URETHRAL MEATUS: ICD-10-CM

## 2023-11-09 LAB
HOLD SPECIMEN: NORMAL
POC APPEARANCE, URINE: CLEAR
POC BILIRUBIN, URINE: NEGATIVE
POC BLOOD, URINE: ABNORMAL
POC COLOR, URINE: YELLOW
POC GLUCOSE, URINE: NEGATIVE MG/DL
POC KETONES, URINE: ABNORMAL MG/DL
POC LEUKOCYTES, URINE: ABNORMAL
POC NITRITE,URINE: NEGATIVE
POC PH, URINE: 6.5 PH
POC PROTEIN, URINE: NEGATIVE MG/DL
POC SPECIFIC GRAVITY, URINE: 1.02
POC UROBILINOGEN, URINE: 0.2 EU/DL

## 2023-11-09 PROCEDURE — 87205 SMEAR GRAM STAIN: CPT

## 2023-11-09 PROCEDURE — 81001 URINALYSIS AUTO W/SCOPE: CPT

## 2023-11-09 PROCEDURE — 99204 OFFICE O/P NEW MOD 45 MIN: CPT | Performed by: NURSE PRACTITIONER

## 2023-11-09 PROCEDURE — 1036F TOBACCO NON-USER: CPT | Performed by: NURSE PRACTITIONER

## 2023-11-09 PROCEDURE — 81003 URINALYSIS AUTO W/O SCOPE: CPT | Performed by: NURSE PRACTITIONER

## 2023-11-09 PROCEDURE — 1159F MED LIST DOCD IN RCRD: CPT | Performed by: NURSE PRACTITIONER

## 2023-11-09 PROCEDURE — 1160F RVW MEDS BY RX/DR IN RCRD: CPT | Performed by: NURSE PRACTITIONER

## 2023-11-09 PROCEDURE — 3077F SYST BP >= 140 MM HG: CPT | Performed by: NURSE PRACTITIONER

## 2023-11-09 PROCEDURE — 3079F DIAST BP 80-89 MM HG: CPT | Performed by: NURSE PRACTITIONER

## 2023-11-09 RX ORDER — ESTRADIOL 0.1 MG/G
CREAM VAGINAL
Qty: 42.5 G | Refills: 5 | Status: SHIPPED | OUTPATIENT
Start: 2023-11-09 | End: 2024-01-23 | Stop reason: ALTCHOICE

## 2023-11-09 RX ORDER — FLUCONAZOLE 150 MG/1
150 TABLET ORAL
Qty: 3 TABLET | Refills: 0 | Status: SHIPPED | OUTPATIENT
Start: 2023-11-09 | End: 2023-11-16

## 2023-11-09 NOTE — PROGRESS NOTES
Subjective   Patient ID: Cydney Lin is a 67 y.o. female who presents for Difficulty Urinating.  Developed chest pain at the end of August. Concern for possible air in the bladder   Given antibiotics for UTI. Once she completed this burning started. She presented to urgent care and was told she had a UTI. Given additional antibiotic which did not resolve symptoms. Review of cultures found they were both negative. Denies gross hematuria. History of nephrolithiasis requiring lithotripsy in 2019.     Never had burning previously.     Intimate with male spouse. States she does not have pain with intercourse. Postmenopausal since age 50 after hysterectomy. Use of water based lubricants         Review of Systems   All other systems reviewed and are negative.        Objective   Physical Exam  Genitourinary:     Urethra: Prolapse present.      Comments: Dryness of vaginal tissue with white discharge noted.       Constitutional: Patient generally appears stated age. Good nutrition. No deformities. Good attention to grooming.  Neck: No neck masses. Good symmetry. No crepitus. Normal thyroid size and consistency with no masses.  Respiratory: Normal respiratory effort. No intercostal retractions. No use of accessory muscles.  Cardiovascular: Examination of the peripheral vascular system reveals no swelling or varicosities with good pulses. Normal extremity temperature, no edema or tenderness.  Abdomen: Examination of the abdomen reveals no masses or tenderness. No hernias detected. Normal liver and spleen size.   Lymphatic: No palpable lymph nodes in the neck, axilla, groin or other locations  Skin: Inspection of the skin reveals no rashes, lesions, ulcers.  Neurologic/Psychiatric: Oriented to time, place and person. Normal mood and affect with no depression, anxiety, or agitation.      Assessment/Plan   Diagnoses and all orders for this visit:  Yeast infection of the vagina  -     fluconazole (Diflucan) 150 mg tablet; Take 1  tablet (150 mg) by mouth every 3rd day for 3 doses.  Dysuria  -     Urinalysis with Reflex Microscopic and Culture; Future  -     Vaginitis Gram Stain For Bacterial Vaginosis + Yeast; Future  Abnormality of urethral meatus  -     estradiol (Estrace) 0.01 % (0.1 mg/gram) vaginal cream; Apply pea sized amount nightly for 2 weeks, then 3 times per week. Do not use applicator    Swab sent for yeast, will treat with diflucan given symptoms. Initiate vaginal estradiol. Patient verbalized understanding. 3 month follow up

## 2023-11-10 ENCOUNTER — TELEPHONE (OUTPATIENT)
Dept: PRIMARY CARE | Facility: CLINIC | Age: 68
End: 2023-11-10
Payer: MEDICARE

## 2023-11-10 DIAGNOSIS — I10 PRIMARY HYPERTENSION: ICD-10-CM

## 2023-11-10 LAB
APPEARANCE UR: CLEAR
BACTERIAL VAGINOSIS VAG-IMP: NORMAL
BILIRUB UR STRIP.AUTO-MCNC: NEGATIVE MG/DL
CLUE CELLS VAG LPF-#/AREA: NORMAL /[LPF]
COLOR UR: YELLOW
GLUCOSE UR STRIP.AUTO-MCNC: NEGATIVE MG/DL
KETONES UR STRIP.AUTO-MCNC: NEGATIVE MG/DL
LEUKOCYTE ESTERASE UR QL STRIP.AUTO: ABNORMAL
MUCOUS THREADS #/AREA URNS AUTO: NORMAL /LPF
NITRITE UR QL STRIP.AUTO: NEGATIVE
NUGENT SCORE: 4
PH UR STRIP.AUTO: 6 [PH]
PROT UR STRIP.AUTO-MCNC: NEGATIVE MG/DL
RBC # UR STRIP.AUTO: NEGATIVE /UL
RBC #/AREA URNS AUTO: NORMAL /HPF
SP GR UR STRIP.AUTO: 1.02
SQUAMOUS #/AREA URNS AUTO: NORMAL /HPF
UROBILINOGEN UR STRIP.AUTO-MCNC: <2 MG/DL
WBC #/AREA URNS AUTO: NORMAL /HPF
YEAST VAG WET PREP-#/AREA: NORMAL

## 2023-11-10 RX ORDER — LOSARTAN POTASSIUM 100 MG/1
100 TABLET ORAL DAILY
Qty: 3 TABLET | Refills: 0 | Status: SHIPPED | OUTPATIENT
Start: 2023-11-10 | End: 2023-12-13 | Stop reason: SDUPTHER

## 2023-11-17 ENCOUNTER — PATIENT OUTREACH (OUTPATIENT)
Dept: CARE COORDINATION | Facility: CLINIC | Age: 68
End: 2023-11-17
Payer: MEDICARE

## 2023-11-17 NOTE — PROGRESS NOTES
Call regarding after hospitalization.  At time of outreach call the patient feels as if their condition has improved since last visit.  Reviewed the PCP appointment with the pt and addressed any questions or concerns.  Cydney is very unhappy with finding out she has no uti and was tx x 2 for with abx and now with yeast infection. She had to wait 6 weeks to find out was not uti but yeast. She is doing better with current tx now.

## 2023-12-10 PROCEDURE — 87205 SMEAR GRAM STAIN: CPT

## 2023-12-10 PROCEDURE — 87086 URINE CULTURE/COLONY COUNT: CPT

## 2023-12-11 ENCOUNTER — LAB REQUISITION (OUTPATIENT)
Dept: LAB | Facility: HOSPITAL | Age: 68
End: 2023-12-11
Payer: MEDICARE

## 2023-12-11 DIAGNOSIS — N76.89 OTHER SPECIFIED INFLAMMATION OF VAGINA AND VULVA: ICD-10-CM

## 2023-12-12 LAB
BACTERIA UR CULT: ABNORMAL
BACTERIAL VAGINOSIS VAG-IMP: NORMAL
CLUE CELLS VAG LPF-#/AREA: NORMAL /[LPF]
NUGENT SCORE: 4
YEAST VAG WET PREP-#/AREA: NORMAL

## 2023-12-13 ENCOUNTER — PATIENT OUTREACH (OUTPATIENT)
Dept: PRIMARY CARE | Facility: CLINIC | Age: 68
End: 2023-12-13
Payer: MEDICARE

## 2023-12-13 DIAGNOSIS — I10 PRIMARY HYPERTENSION: ICD-10-CM

## 2023-12-13 RX ORDER — LOSARTAN POTASSIUM 100 MG/1
100 TABLET ORAL DAILY
Qty: 90 TABLET | Refills: 3 | Status: SHIPPED | OUTPATIENT
Start: 2023-12-13 | End: 2024-12-07

## 2023-12-13 NOTE — PROGRESS NOTES
Patient has met target of no readmission for (90) days post hospital discharge and is graduated from [facility] Care [facility] program at this time.  Cydney states she had to go to  for urinary sx's again and was dx with UTI. Her Urologist can't see her until Feb 2024.

## 2024-01-09 ENCOUNTER — OFFICE VISIT (OUTPATIENT)
Dept: UROLOGY | Facility: CLINIC | Age: 69
End: 2024-01-09
Payer: MEDICARE

## 2024-01-09 VITALS — TEMPERATURE: 97.1 F | SYSTOLIC BLOOD PRESSURE: 146 MMHG | HEART RATE: 63 BPM | DIASTOLIC BLOOD PRESSURE: 75 MMHG

## 2024-01-09 DIAGNOSIS — R39.89 BLADDER PAIN: ICD-10-CM

## 2024-01-09 DIAGNOSIS — N93.9 VAGINAL BLEEDING: Primary | ICD-10-CM

## 2024-01-09 DIAGNOSIS — R10.2 VAGINAL PAIN: ICD-10-CM

## 2024-01-09 PROCEDURE — 1036F TOBACCO NON-USER: CPT | Performed by: NURSE PRACTITIONER

## 2024-01-09 PROCEDURE — 99214 OFFICE O/P EST MOD 30 MIN: CPT | Performed by: NURSE PRACTITIONER

## 2024-01-09 PROCEDURE — 3078F DIAST BP <80 MM HG: CPT | Performed by: NURSE PRACTITIONER

## 2024-01-09 PROCEDURE — 3077F SYST BP >= 140 MM HG: CPT | Performed by: NURSE PRACTITIONER

## 2024-01-09 NOTE — PROGRESS NOTES
Subjective   Patient ID: Cydney Lin is a 68 y.o. female who presents for Pelvic Pain.  Developed chest pain at the end of August. Concern for possible air in the bladder. Given antibiotics for UTI. Once she completed this burning started. She presented to urgent care and was told she had a UTI. Given additional antibiotic which did not resolve symptoms. Review of cultures found they were both negative. Denies gross hematuria. History of nephrolithiasis requiring lithotripsy in 2019.      Never had burning previously.      Intimate with male spouse. States she does not have pain with intercourse. Postmenopausal since age 50 after hysterectomy. Use of water based lubricants.    Negative eval at the beginning of Nov. Negative swab at that time for yeast although she did complete treatment with fluconazole, some benefit. She is continuing to use vaginal estradiol. Having some pain after intercourse. Blood on one occasion. Feels almost constant burning in the bladder, mild improvement after urination.           Review of Systems   All other systems reviewed and are negative.      Objective   Physical Exam  Vitals reviewed.       Alert and oriented x3  Moist mucous membranes  Breathes easily on room air  Abdomen soft, nondistended. Bladder pressure   No edema  No scleral icterus  No focal neurological deficits  Appears stated age, no acute distress    Assessment/Plan   Diagnoses and all orders for this visit:  Vaginal bleeding  -     US pelvis transvaginal; Future  Bladder pain  -     US pelvis transvaginal; Future  -     Cystourethroscopy; Future  Vaginal pain    Urine to be sent for culture via PCR. Will arrange for cysto and eval with Dr. Pak.        Jocy Belcher, VIKRAM-CNP 01/09/24 4:23 PM

## 2024-01-12 ENCOUNTER — ANCILLARY PROCEDURE (OUTPATIENT)
Dept: RADIOLOGY | Facility: CLINIC | Age: 69
End: 2024-01-12
Payer: MEDICARE

## 2024-01-12 DIAGNOSIS — R39.89 BLADDER PAIN: ICD-10-CM

## 2024-01-12 DIAGNOSIS — N93.9 VAGINAL BLEEDING: ICD-10-CM

## 2024-01-12 PROCEDURE — 76830 TRANSVAGINAL US NON-OB: CPT | Performed by: RADIOLOGY

## 2024-01-12 PROCEDURE — 76857 US EXAM PELVIC LIMITED: CPT | Performed by: RADIOLOGY

## 2024-01-12 PROCEDURE — 76830 TRANSVAGINAL US NON-OB: CPT

## 2024-01-18 ENCOUNTER — PROCEDURE VISIT (OUTPATIENT)
Dept: UROLOGY | Facility: CLINIC | Age: 69
End: 2024-01-18
Payer: MEDICARE

## 2024-01-18 VITALS
WEIGHT: 126.2 LBS | TEMPERATURE: 97.5 F | HEIGHT: 64 IN | HEART RATE: 57 BPM | BODY MASS INDEX: 21.54 KG/M2 | SYSTOLIC BLOOD PRESSURE: 168 MMHG | DIASTOLIC BLOOD PRESSURE: 78 MMHG

## 2024-01-18 DIAGNOSIS — N39.0 URINARY TRACT INFECTION WITHOUT HEMATURIA, SITE UNSPECIFIED: Primary | ICD-10-CM

## 2024-01-18 DIAGNOSIS — N30.10 INTERSTITIAL CYSTITIS: ICD-10-CM

## 2024-01-18 LAB
POC APPEARANCE, URINE: CLEAR
POC BILIRUBIN, URINE: NEGATIVE
POC BLOOD, URINE: NEGATIVE
POC COLOR, URINE: YELLOW
POC GLUCOSE, URINE: NEGATIVE MG/DL
POC KETONES, URINE: NEGATIVE MG/DL
POC LEUKOCYTES, URINE: ABNORMAL
POC NITRITE,URINE: NEGATIVE
POC PH, URINE: 6 PH
POC PROTEIN, URINE: NEGATIVE MG/DL
POC SPECIFIC GRAVITY, URINE: 1.02
POC UROBILINOGEN, URINE: 0.2 EU/DL

## 2024-01-18 PROCEDURE — 81002 URINALYSIS NONAUTO W/O SCOPE: CPT | Performed by: OBSTETRICS & GYNECOLOGY

## 2024-01-18 RX ORDER — HYDROXYZINE HYDROCHLORIDE 25 MG/1
25 TABLET, FILM COATED ORAL NIGHTLY
Qty: 30 TABLET | Refills: 7 | Status: SHIPPED | OUTPATIENT
Start: 2024-01-18 | End: 2024-09-14

## 2024-01-18 RX ORDER — METHENAMINE, BENZOIC ACID, PHENYL SALICYLATE, METHYLENE BLUE, AND HYOSCYAMINE SULFATE 9; .12; 81.6; 10.8; 36.2 MG/1; MG/1; MG/1; MG/1; MG/1
1 TABLET, COATED ORAL 4 TIMES DAILY PRN
Qty: 60 TABLET | Refills: 2 | Status: SHIPPED | OUTPATIENT
Start: 2024-01-18

## 2024-01-18 NOTE — PROGRESS NOTES
FOLLOW-UP VISIT     PROBLEM LIST:  1. Recurrent UTI       HISTORY OF PRESENT ILLNESS:   Cydney Lin is a 68 y.o. female who presents today for evaluation of recurrent UTI.    She reports ongoing incomplete bladder emptying which is aggravated by alcohol consumption. She verbalizes her PCP informed her that all her urinalysis have come back negative.  She also notes estrogen creams have not not provided significant relief.     She states she has a past medical history of nephrolithiasis, and adds she was told there is currently a stone in her left kidney but is not concerning.     She denies any atopic allergies and has monitored her diet to check if there is a correlation but has not seen any.  She admits to consuming decaffeinated coffee 1-2 cups a day and drinks lots of water.    PAST MEDICAL HISTORY:  Past Medical History:   Diagnosis Date    Hyperlipidemia     Personal history of urinary calculi 06/09/2021    History of nephrolithiasis       PAST SURGICAL HISTORY:  Past Surgical History:   Procedure Laterality Date    OTHER SURGICAL HISTORY  06/09/2021    Shoulder replacement    OTHER SURGICAL HISTORY  06/09/2021    Lower back surgery        ALLERGIES:   Allergies   Allergen Reactions    Lisinopril Cough    Vicodin [Hydrocodone-Acetaminophen] Rash          SOCIAL HISTORY:  Patient  reports that she has never smoked. She has never been exposed to tobacco smoke. She has never used smokeless tobacco. She reports current alcohol use. She reports current drug use. Drug: Marijuana.   Social History     Socioeconomic History    Marital status:      Spouse name: Not on file    Number of children: Not on file    Years of education: Not on file    Highest education level: Not on file   Occupational History    Not on file   Tobacco Use    Smoking status: Never     Passive exposure: Never    Smokeless tobacco: Never   Vaping Use    Vaping Use: Never used   Substance and Sexual Activity    Alcohol use: Yes      "Comment: 2 to 3 days out of the week.    Drug use: Yes     Types: Marijuana    Sexual activity: Defer   Other Topics Concern    Not on file   Social History Narrative    Not on file     Social Determinants of Health     Financial Resource Strain: Not on file   Food Insecurity: Not on file   Transportation Needs: Not on file   Physical Activity: Not on file   Stress: Not on file   Social Connections: Not on file   Intimate Partner Violence: Not on file   Housing Stability: Not on file       FAMILY HISTORY:  Family History   Problem Relation Name Age of Onset    Colon cancer Mother  85    Multiple sclerosis Father      Stroke Maternal Grandmother         REVIEW OF SYSTEMS:  Constitutional: Negative for fever and chills. Denies anorexia, weight loss.  Eyes: Negative for visual disturbance.   Respiratory: Negative for shortness of breath.    Cardiovascular: Negative for chest pain.   Gastrointestinal: Negative for nausea and vomiting.   Genitourinary: See interval history above.  Skin: Negative for rash.   Neurological: Negative for dizziness and numbness.   Psychiatric/Behavioral: Negative for confusion and decreased concentration.     PHYSICAL EXAM:  Blood pressure 168/78, pulse 57, temperature 36.4 °C (97.5 °F), temperature source Temporal, height 1.626 m (5' 4\"), weight 57.2 kg (126 lb 3.2 oz).  Constitutional: Patient appears well-developed and well-nourished. No distress.    Head: Normocephalic and atraumatic.    Neck: Normal range of motion.    Cardiovascular: Normal rate.    Pulmonary/Chest: Effort normal. No respiratory distress.   Musculoskeletal: Normal range of motion.    Neurological: Alert and oriented to person, place, and time.  Psychiatric: Normal mood and affect. Behavior is normal. Thought content normal.      After anesthesia was found to be adequate, the cystoscope was white balanced and gently inserted into the urethra. The bladder was filled with normal saline and the bladder carefully inspected. " Bilateral ureteral orifices were normal and effluxed normally. There were no Hunner's lesions or other signs of interstitial cystitis. There were no foreign bodies or particulate matter. Of note, there was moderate trabeculation and hyperemia   punctatations.          Assessment:      1. Urinary tract infection without hematuria, site unspecified  POCT UA (nonautomated) manually resulted          Cydney Lin is a 68 y.o. female with recurrent UTI here for cystoscopy. Cystoscopy performed and well tolerated by patient. Suspect interstitial cystitis.     Plan:    Commence Uribel and Hydroxyzine 25 mg nightly  Follow-up with BINA Rosas in 2-3 months at the hospitals         Scribe Attestation  By signing my name below, Rani VAZQUEZ Scribe   attest that this documentation has been prepared under the direction and in the presence of Kayla Pak MD MPH.

## 2024-01-23 ENCOUNTER — OFFICE VISIT (OUTPATIENT)
Dept: PRIMARY CARE | Facility: CLINIC | Age: 69
End: 2024-01-23
Payer: MEDICARE

## 2024-01-23 VITALS
HEIGHT: 64 IN | HEART RATE: 70 BPM | RESPIRATION RATE: 14 BRPM | TEMPERATURE: 97.6 F | WEIGHT: 129.4 LBS | SYSTOLIC BLOOD PRESSURE: 119 MMHG | OXYGEN SATURATION: 99 % | DIASTOLIC BLOOD PRESSURE: 63 MMHG | BODY MASS INDEX: 22.09 KG/M2

## 2024-01-23 DIAGNOSIS — Z78.0 ASYMPTOMATIC POSTMENOPAUSAL STATUS: ICD-10-CM

## 2024-01-23 DIAGNOSIS — Z12.31 ENCOUNTER FOR SCREENING MAMMOGRAM FOR BREAST CANCER: ICD-10-CM

## 2024-01-23 DIAGNOSIS — Z00.00 ROUTINE GENERAL MEDICAL EXAMINATION AT HEALTH CARE FACILITY: Primary | ICD-10-CM

## 2024-01-23 DIAGNOSIS — M19.011 OSTEOARTHRITIS OF BOTH SHOULDERS, UNSPECIFIED OSTEOARTHRITIS TYPE: ICD-10-CM

## 2024-01-23 DIAGNOSIS — E78.2 MIXED HYPERLIPIDEMIA: ICD-10-CM

## 2024-01-23 DIAGNOSIS — I10 PRIMARY HYPERTENSION: ICD-10-CM

## 2024-01-23 DIAGNOSIS — R53.83 OTHER FATIGUE: ICD-10-CM

## 2024-01-23 DIAGNOSIS — E55.9 VITAMIN D DEFICIENCY: ICD-10-CM

## 2024-01-23 DIAGNOSIS — F41.9 ANXIETY DISORDER, UNSPECIFIED TYPE: ICD-10-CM

## 2024-01-23 DIAGNOSIS — M19.012 OSTEOARTHRITIS OF BOTH SHOULDERS, UNSPECIFIED OSTEOARTHRITIS TYPE: ICD-10-CM

## 2024-01-23 PROCEDURE — 99397 PER PM REEVAL EST PAT 65+ YR: CPT | Performed by: INTERNAL MEDICINE

## 2024-01-23 PROCEDURE — 1159F MED LIST DOCD IN RCRD: CPT | Performed by: INTERNAL MEDICINE

## 2024-01-23 PROCEDURE — 3074F SYST BP LT 130 MM HG: CPT | Performed by: INTERNAL MEDICINE

## 2024-01-23 PROCEDURE — G0439 PPPS, SUBSEQ VISIT: HCPCS | Performed by: INTERNAL MEDICINE

## 2024-01-23 PROCEDURE — 99214 OFFICE O/P EST MOD 30 MIN: CPT | Performed by: INTERNAL MEDICINE

## 2024-01-23 PROCEDURE — 1160F RVW MEDS BY RX/DR IN RCRD: CPT | Performed by: INTERNAL MEDICINE

## 2024-01-23 PROCEDURE — 1036F TOBACCO NON-USER: CPT | Performed by: INTERNAL MEDICINE

## 2024-01-23 PROCEDURE — 1170F FXNL STATUS ASSESSED: CPT | Performed by: INTERNAL MEDICINE

## 2024-01-23 PROCEDURE — 3078F DIAST BP <80 MM HG: CPT | Performed by: INTERNAL MEDICINE

## 2024-01-23 PROCEDURE — 1123F ACP DISCUSS/DSCN MKR DOCD: CPT | Performed by: INTERNAL MEDICINE

## 2024-01-23 RX ORDER — OMEPRAZOLE 20 MG/1
20 TABLET, DELAYED RELEASE ORAL
COMMUNITY

## 2024-01-23 ASSESSMENT — ENCOUNTER SYMPTOMS
FLANK PAIN: 0
WHEEZING: 0
ACTIVITY CHANGE: 0
SINUS PRESSURE: 0
OCCASIONAL FEELINGS OF UNSTEADINESS: 0
NEUROLOGICAL NEGATIVE: 1
EYE PAIN: 0
VOICE CHANGE: 0
BLOOD IN STOOL: 0
STRIDOR: 0
ENDOCRINE NEGATIVE: 1
FACIAL ASYMMETRY: 0
HALLUCINATIONS: 0
NAUSEA: 0
CARDIOVASCULAR NEGATIVE: 1
DEPRESSION: 0
DYSURIA: 1
CHEST TIGHTNESS: 0
CONSTITUTIONAL NEGATIVE: 1
PSYCHIATRIC NEGATIVE: 1
NECK PAIN: 0
RESPIRATORY NEGATIVE: 1
UNEXPECTED WEIGHT CHANGE: 0
BACK PAIN: 0
CONSTIPATION: 0
AGITATION: 0
EYE REDNESS: 0
LIGHT-HEADEDNESS: 0
DIZZINESS: 0
EYE DISCHARGE: 0
DIARRHEA: 0
POLYDIPSIA: 0
ALLERGIC/IMMUNOLOGIC NEGATIVE: 1
COLOR CHANGE: 0
TROUBLE SWALLOWING: 0
HEMATOLOGIC/LYMPHATIC NEGATIVE: 1
COUGH: 0
FREQUENCY: 0
ABDOMINAL PAIN: 0
POLYPHAGIA: 0
SORE THROAT: 0
GASTROINTESTINAL NEGATIVE: 1
NUMBNESS: 0
HEADACHES: 0
WEAKNESS: 0
WOUND: 0
BRUISES/BLEEDS EASILY: 0
SPEECH DIFFICULTY: 0
SLEEP DISTURBANCE: 0
EYES NEGATIVE: 1
CONFUSION: 0
ARTHRALGIAS: 1
NECK STIFFNESS: 0
DIFFICULTY URINATING: 0
JOINT SWELLING: 0
TREMORS: 0
ADENOPATHY: 0
VOMITING: 0
LOSS OF SENSATION IN FEET: 0
APPETITE CHANGE: 0
SINUS PAIN: 0
SHORTNESS OF BREATH: 0
NERVOUS/ANXIOUS: 0
SEIZURES: 0
PALPITATIONS: 0
MYALGIAS: 0

## 2024-01-23 ASSESSMENT — ACTIVITIES OF DAILY LIVING (ADL)
MANAGING_FINANCES: INDEPENDENT
GROCERY_SHOPPING: INDEPENDENT
BATHING: INDEPENDENT
DRESSING: INDEPENDENT
DOING_HOUSEWORK: INDEPENDENT
TAKING_MEDICATION: INDEPENDENT

## 2024-01-23 ASSESSMENT — PATIENT HEALTH QUESTIONNAIRE - PHQ9
SUM OF ALL RESPONSES TO PHQ9 QUESTIONS 1 AND 2: 0
1. LITTLE INTEREST OR PLEASURE IN DOING THINGS: NOT AT ALL
2. FEELING DOWN, DEPRESSED OR HOPELESS: NOT AT ALL
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
## 2024-02-07 ENCOUNTER — HOSPITAL ENCOUNTER (OUTPATIENT)
Dept: RADIOLOGY | Facility: CLINIC | Age: 69
Discharge: HOME | End: 2024-02-07
Payer: MEDICARE

## 2024-02-07 ENCOUNTER — LAB (OUTPATIENT)
Dept: LAB | Facility: LAB | Age: 69
End: 2024-02-07
Payer: MEDICARE

## 2024-02-07 DIAGNOSIS — Z78.0 ASYMPTOMATIC POSTMENOPAUSAL STATUS: ICD-10-CM

## 2024-02-07 DIAGNOSIS — E55.9 VITAMIN D DEFICIENCY: ICD-10-CM

## 2024-02-07 DIAGNOSIS — R53.83 OTHER FATIGUE: ICD-10-CM

## 2024-02-07 DIAGNOSIS — Z12.31 BREAST CANCER SCREENING BY MAMMOGRAM: ICD-10-CM

## 2024-02-07 DIAGNOSIS — E78.2 MIXED HYPERLIPIDEMIA: ICD-10-CM

## 2024-02-07 LAB
25(OH)D3 SERPL-MCNC: 39 NG/ML (ref 30–100)
ALBUMIN SERPL BCP-MCNC: 4.1 G/DL (ref 3.4–5)
ALP SERPL-CCNC: 93 U/L (ref 33–136)
ALT SERPL W P-5'-P-CCNC: 45 U/L (ref 7–45)
ANION GAP SERPL CALC-SCNC: 11 MMOL/L (ref 10–20)
AST SERPL W P-5'-P-CCNC: 28 U/L (ref 9–39)
BILIRUB SERPL-MCNC: 0.6 MG/DL (ref 0–1.2)
BUN SERPL-MCNC: 16 MG/DL (ref 6–23)
CALCIUM SERPL-MCNC: 9.1 MG/DL (ref 8.6–10.3)
CHLORIDE SERPL-SCNC: 104 MMOL/L (ref 98–107)
CHOLEST SERPL-MCNC: 247 MG/DL (ref 0–199)
CHOLESTEROL/HDL RATIO: 3.3
CO2 SERPL-SCNC: 31 MMOL/L (ref 21–32)
CREAT SERPL-MCNC: 0.72 MG/DL (ref 0.5–1.05)
EGFRCR SERPLBLD CKD-EPI 2021: >90 ML/MIN/1.73M*2
ERYTHROCYTE [DISTWIDTH] IN BLOOD BY AUTOMATED COUNT: 12.9 % (ref 11.5–14.5)
GLUCOSE SERPL-MCNC: 85 MG/DL (ref 74–99)
HCT VFR BLD AUTO: 40 % (ref 36–46)
HDLC SERPL-MCNC: 74.5 MG/DL
HGB BLD-MCNC: 13.3 G/DL (ref 12–16)
LDLC SERPL CALC-MCNC: 157 MG/DL
MCH RBC QN AUTO: 29.4 PG (ref 26–34)
MCHC RBC AUTO-ENTMCNC: 33.3 G/DL (ref 32–36)
MCV RBC AUTO: 89 FL (ref 80–100)
NON HDL CHOLESTEROL: 173 MG/DL (ref 0–149)
NRBC BLD-RTO: 0 /100 WBCS (ref 0–0)
PLATELET # BLD AUTO: 243 X10*3/UL (ref 150–450)
POTASSIUM SERPL-SCNC: 4.5 MMOL/L (ref 3.5–5.3)
PROT SERPL-MCNC: 6.7 G/DL (ref 6.4–8.2)
RBC # BLD AUTO: 4.52 X10*6/UL (ref 4–5.2)
SODIUM SERPL-SCNC: 141 MMOL/L (ref 136–145)
TRIGL SERPL-MCNC: 77 MG/DL (ref 0–149)
TSH SERPL-ACNC: 1.66 MIU/L (ref 0.44–3.98)
VLDL: 15 MG/DL (ref 0–40)
WBC # BLD AUTO: 4.6 X10*3/UL (ref 4.4–11.3)

## 2024-02-07 PROCEDURE — 77067 SCR MAMMO BI INCL CAD: CPT | Performed by: RADIOLOGY

## 2024-02-07 PROCEDURE — 80053 COMPREHEN METABOLIC PANEL: CPT

## 2024-02-07 PROCEDURE — 77080 DXA BONE DENSITY AXIAL: CPT | Performed by: STUDENT IN AN ORGANIZED HEALTH CARE EDUCATION/TRAINING PROGRAM

## 2024-02-07 PROCEDURE — 82306 VITAMIN D 25 HYDROXY: CPT

## 2024-02-07 PROCEDURE — 77080 DXA BONE DENSITY AXIAL: CPT

## 2024-02-07 PROCEDURE — 77063 BREAST TOMOSYNTHESIS BI: CPT | Performed by: RADIOLOGY

## 2024-02-07 PROCEDURE — 85027 COMPLETE CBC AUTOMATED: CPT

## 2024-02-07 PROCEDURE — 80061 LIPID PANEL: CPT

## 2024-02-07 PROCEDURE — 77067 SCR MAMMO BI INCL CAD: CPT

## 2024-02-07 PROCEDURE — 84443 ASSAY THYROID STIM HORMONE: CPT

## 2024-02-07 PROCEDURE — 36415 COLL VENOUS BLD VENIPUNCTURE: CPT

## 2024-02-11 NOTE — RESULT ENCOUNTER NOTE
Please call with results and schedule appt within 2 months to discuss treatment:  Your bone density scan showed osteoporosis.  Please maintain enough calcium in your diet:  0344-9871 mg daily (consume foods rich in calcium rather than taking only calcium supplement to meet daily calcium requirements), take daily Vitamin D supplement with meal. Average Vit D dose is 2000 international units daily.  Weight bearing exercise routine is recommended. We will discuss details during your next office visit.  Dr. Ambriz

## 2024-02-12 ENCOUNTER — APPOINTMENT (OUTPATIENT)
Dept: UROLOGY | Facility: CLINIC | Age: 69
End: 2024-02-12
Payer: MEDICARE

## 2024-02-12 ENCOUNTER — TELEPHONE (OUTPATIENT)
Dept: PRIMARY CARE | Facility: CLINIC | Age: 69
End: 2024-02-12

## 2024-02-12 NOTE — TELEPHONE ENCOUNTER
----- Message from Verena Montiel MD PhD sent at 2/10/2024  9:11 PM EST -----  Please mail results;  Your recent mammogram did not show evidence of malignancy.

## 2024-02-12 NOTE — TELEPHONE ENCOUNTER
----- Message from Verena Montiel MD PhD sent at 2/10/2024  9:07 PM EST -----  Please call with results and schedule appt within 2 months to discuss treatment:  Your bone density scan showed osteoporosis.  Please maintain enough calcium in your diet:  5070-4038 mg daily (consume foods rich in calcium rather than taking only calcium supplement to meet daily calcium requirements), take daily Vitamin D supplement with meal. Average Vit D dose is 2000 international units daily.  Weight bearing exercise routine is recommended. We will discuss details during your next office visit.  Dr. Ambriz

## 2024-02-12 NOTE — TELEPHONE ENCOUNTER
----- Message from Verena Montiel MD PhD sent at 2/10/2024 12:29 PM EST -----  Please mail results:  Your recent blood work was acceptable except elevated cholesterol.  Low cholesterol diet is advised. All results may not be within normal range but they are not clinically significant at this time and do not require change in your therapy. We will discuss details during your next office visit. Please keep your next appointment as scheduled. Dr. Ambriz

## 2024-04-08 ENCOUNTER — OFFICE VISIT (OUTPATIENT)
Dept: PRIMARY CARE | Facility: CLINIC | Age: 69
End: 2024-04-08
Payer: MEDICARE

## 2024-04-10 ENCOUNTER — OFFICE VISIT (OUTPATIENT)
Dept: PRIMARY CARE | Facility: CLINIC | Age: 69
End: 2024-04-10
Payer: MEDICARE

## 2024-04-10 VITALS
OXYGEN SATURATION: 99 % | DIASTOLIC BLOOD PRESSURE: 72 MMHG | TEMPERATURE: 98 F | HEART RATE: 56 BPM | WEIGHT: 131 LBS | BODY MASS INDEX: 22.36 KG/M2 | HEIGHT: 64 IN | SYSTOLIC BLOOD PRESSURE: 132 MMHG | RESPIRATION RATE: 16 BRPM

## 2024-04-10 DIAGNOSIS — E78.2 MIXED HYPERLIPIDEMIA: ICD-10-CM

## 2024-04-10 DIAGNOSIS — M19.012 OSTEOARTHRITIS OF BOTH SHOULDERS, UNSPECIFIED OSTEOARTHRITIS TYPE: ICD-10-CM

## 2024-04-10 DIAGNOSIS — M19.011 OSTEOARTHRITIS OF BOTH SHOULDERS, UNSPECIFIED OSTEOARTHRITIS TYPE: ICD-10-CM

## 2024-04-10 DIAGNOSIS — I10 PRIMARY HYPERTENSION: ICD-10-CM

## 2024-04-10 DIAGNOSIS — M81.0 AGE-RELATED OSTEOPOROSIS WITHOUT CURRENT PATHOLOGICAL FRACTURE: Primary | ICD-10-CM

## 2024-04-10 DIAGNOSIS — E55.9 VITAMIN D DEFICIENCY: ICD-10-CM

## 2024-04-10 PROCEDURE — 1160F RVW MEDS BY RX/DR IN RCRD: CPT | Performed by: INTERNAL MEDICINE

## 2024-04-10 PROCEDURE — 1159F MED LIST DOCD IN RCRD: CPT | Performed by: INTERNAL MEDICINE

## 2024-04-10 PROCEDURE — 3078F DIAST BP <80 MM HG: CPT | Performed by: INTERNAL MEDICINE

## 2024-04-10 PROCEDURE — 1123F ACP DISCUSS/DSCN MKR DOCD: CPT | Performed by: INTERNAL MEDICINE

## 2024-04-10 PROCEDURE — 1036F TOBACCO NON-USER: CPT | Performed by: INTERNAL MEDICINE

## 2024-04-10 PROCEDURE — 3075F SYST BP GE 130 - 139MM HG: CPT | Performed by: INTERNAL MEDICINE

## 2024-04-10 PROCEDURE — 99214 OFFICE O/P EST MOD 30 MIN: CPT | Performed by: INTERNAL MEDICINE

## 2024-04-10 RX ORDER — IBANDRONATE SODIUM 150 MG/1
150 TABLET, FILM COATED ORAL
Qty: 3 TABLET | Refills: 3 | Status: SHIPPED | OUTPATIENT
Start: 2024-04-10 | End: 2025-04-10

## 2024-04-10 ASSESSMENT — ENCOUNTER SYMPTOMS
RESPIRATORY NEGATIVE: 1
HEMATOLOGIC/LYMPHATIC NEGATIVE: 1
POLYPHAGIA: 0
SORE THROAT: 0
GASTROINTESTINAL NEGATIVE: 1
JOINT SWELLING: 0
EYE REDNESS: 0
FREQUENCY: 0
UNEXPECTED WEIGHT CHANGE: 0
DYSURIA: 0
DIZZINESS: 0
BACK PAIN: 0
NAUSEA: 0
EYE PAIN: 0
VOMITING: 0
MYALGIAS: 0
TROUBLE SWALLOWING: 0
BLOOD IN STOOL: 0
STRIDOR: 0
EYE DISCHARGE: 0
WHEEZING: 0
COUGH: 0
HALLUCINATIONS: 0
NECK STIFFNESS: 0
ENDOCRINE NEGATIVE: 1
SHORTNESS OF BREATH: 0
FLANK PAIN: 0
CARDIOVASCULAR NEGATIVE: 1
ALLERGIC/IMMUNOLOGIC NEGATIVE: 1
POLYDIPSIA: 0
WEAKNESS: 0
EYES NEGATIVE: 1
DIARRHEA: 0
TREMORS: 0
PALPITATIONS: 0
ACTIVITY CHANGE: 0
SLEEP DISTURBANCE: 0
NERVOUS/ANXIOUS: 0
APPETITE CHANGE: 0
ADENOPATHY: 0
AGITATION: 0
COLOR CHANGE: 0
CHEST TIGHTNESS: 0
DIFFICULTY URINATING: 0
SINUS PRESSURE: 0
VOICE CHANGE: 0
SINUS PAIN: 0
SPEECH DIFFICULTY: 0
CONSTIPATION: 0
NECK PAIN: 0
HEADACHES: 0
ARTHRALGIAS: 1
PSYCHIATRIC NEGATIVE: 1
BRUISES/BLEEDS EASILY: 0
WOUND: 0
CONSTITUTIONAL NEGATIVE: 1
FACIAL ASYMMETRY: 0
CONFUSION: 0
SEIZURES: 0
LIGHT-HEADEDNESS: 0
NEUROLOGICAL NEGATIVE: 1
ABDOMINAL PAIN: 0
NUMBNESS: 0

## 2024-04-10 NOTE — PROGRESS NOTES
Subjective   Patient ID: Cydney Lin is a 68 y.o. female who presents for Follow-up (Patient is here today due to bone density discuss).  HPI      Patient has been feeling pretty good and has been complaint with prescribed medications.      Elevated blood pressure noted on arrival. Decreased after resting in the office. I personally rechecked patient's blood pressure.     We reviewed and discussed details of recent blood work: CBC, CMP, TSH, Lipid panel,  Vit D done in Feb 2024.   Results within acceptable range except elevated cholesterol.    Her DEXA scan done in Feb 2024 showed osteoporosis.     We discussed different options of pharmacological therapy for osteoporosis.   Patient willing to try oral bisphosphonate.  We discussed possible side effects of bisphosphonates including GI side effects, ONJ and paradoxical bone fx.   Patient  is up to date with her dental appts and no current issues identified. 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   Detailed instructions given regarding medication administration. Length of therapy: most likely 5 years if tolerating well.  We also discussed calcium and Vit d supplementation.   Adequate calcium intake is important to prevent osteoporosis (weakening of the bones).   Fractures/broken bones involving the spine, hip and forearm are common complications of osteoporosis which affect a large percent of the female population and usually does not cause any symptoms until the disease is quite advanced.   It is recommended to increase calcium intake to a total of 1500mg elemental calcium per day.   One cup of milk or yogurt each provide 300mg of calcium.   One and a half ounces of cheese also has 300mg of calcium.   If not able to reach the above goals of calcium intake from dietary intake alone,  begin taking a calcium supplement.   Calcium supplements are available over the counter.   The product chosen should also contain vitamin D, which helps in the absorption of the  calcium. (Calcium citrate is commonly recommended as it is well absorbed with or without food, does NOT increase calcium oxalate kidney stones, and the citrate in the urine tends to make the urine less irritating for the vulva.)   Calcium may be a mood stablizer in women with PMS and may help lower blood pressure.    Other examples of Food Sources of Calcium:  Food Milligrams (mg)  per serving Percent DV*   Soymilk, calcium-fortified, 8 ounces 299 30   Orange juice, calcium-fortified, 6 ounces 261 26   Tofu, firm, made with calcium sulfate, ½ cup* 253 25   Tofu, soft, made with calcium sulfate, ½ cup* 138 14   Ready-to-eat cereal, calcium-fortified, 1 cup 100-1,000    Turnip greens, fresh, boiled, ½ cup 99 10   Kale, raw, chopped, 1 cup 100 10   Kale, fresh, cooked, 1 cup 94 9   Chinese cabbage, bok vargas, raw, shredded, 1 cup 74 7   Bread, white, 1 slice 73 7   Tortilla, corn, ready-to-bake/rodriguez, one 6” diameter 46 5   Tortilla, flour, ready-to-bake/rodriguez, one 6” diameter 32 3   Bread, whole-wheat, 1 slice 30 3   Broccoli, raw, ½ cup 21 2   Review of Systems   Constitutional: Negative.  Negative for activity change, appetite change and unexpected weight change.   HENT: Negative.  Negative for congestion, ear discharge, ear pain, hearing loss, mouth sores, nosebleeds, sinus pressure, sinus pain, sore throat, trouble swallowing and voice change.    Eyes: Negative.  Negative for pain, discharge, redness and visual disturbance.   Respiratory: Negative.  Negative for cough, chest tightness, shortness of breath, wheezing and stridor.    Cardiovascular: Negative.  Negative for chest pain, palpitations and leg swelling.   Gastrointestinal: Negative.  Negative for abdominal pain, blood in stool, constipation, diarrhea, nausea and vomiting.   Endocrine: Negative.  Negative for polydipsia, polyphagia and polyuria.   Genitourinary: Negative.  Negative for difficulty urinating, dysuria, flank pain, frequency and urgency.  "  Musculoskeletal:  Positive for arthralgias. Negative for back pain, gait problem, joint swelling, myalgias, neck pain and neck stiffness.   Skin: Negative.  Negative for color change, rash and wound.   Allergic/Immunologic: Negative.  Negative for environmental allergies, food allergies and immunocompromised state.   Neurological: Negative.  Negative for dizziness, tremors, seizures, syncope, facial asymmetry, speech difficulty, weakness, light-headedness, numbness and headaches.   Hematological: Negative.  Negative for adenopathy. Does not bruise/bleed easily.   Psychiatric/Behavioral: Negative.  Negative for agitation, behavioral problems, confusion, hallucinations, sleep disturbance and suicidal ideas. The patient is not nervous/anxious.    All other systems reviewed and are negative.      Objective     Review of systems was performed and all systems were negative except what in HPI    /72   Pulse 56   Temp 36.7 °C (98 °F) (Temporal)   Resp 16   Ht 1.626 m (5' 4\")   Wt 59.4 kg (131 lb)   SpO2 99%   BMI 22.49 kg/m²    Physical Exam  Vitals and nursing note reviewed.   Constitutional:       General: She is not in acute distress.     Appearance: Normal appearance.   HENT:      Head: Normocephalic and atraumatic.      Right Ear: External ear normal.      Left Ear: External ear normal.      Nose: Nose normal. No congestion or rhinorrhea.   Eyes:      General:         Right eye: No discharge.         Left eye: No discharge.      Extraocular Movements: Extraocular movements intact.      Conjunctiva/sclera: Conjunctivae normal.      Pupils: Pupils are equal, round, and reactive to light.   Cardiovascular:      Rate and Rhythm: Normal rate and regular rhythm.      Pulses: Normal pulses.      Heart sounds: Normal heart sounds. No murmur heard.     No friction rub. No gallop.   Pulmonary:      Effort: Pulmonary effort is normal. No respiratory distress.      Breath sounds: Normal breath sounds. No stridor. No " wheezing, rhonchi or rales.   Chest:      Chest wall: No tenderness.   Abdominal:      General: Bowel sounds are normal.      Palpations: Abdomen is soft. There is no mass.      Tenderness: There is no abdominal tenderness. There is no guarding or rebound.   Musculoskeletal:         General: No swelling or deformity. Normal range of motion.      Cervical back: Normal range of motion and neck supple. No rigidity or tenderness.      Right lower leg: No edema.      Left lower leg: No edema.   Lymphadenopathy:      Cervical: No cervical adenopathy.   Skin:     General: Skin is warm and dry.      Coloration: Skin is not jaundiced.      Findings: No bruising or erythema.   Neurological:      General: No focal deficit present.      Mental Status: She is alert and oriented to person, place, and time. Mental status is at baseline.      Cranial Nerves: No cranial nerve deficit.      Motor: No weakness.      Coordination: Coordination normal.      Gait: Gait normal.   Psychiatric:         Mood and Affect: Mood normal.         Behavior: Behavior normal.         Thought Content: Thought content normal.         Judgment: Judgment normal.         Assessment/Plan   Problem List Items Addressed This Visit             ICD-10-CM       Cardiac and Vasculature    Hyperlipidemia E78.5     Low cholesterol diet is advised. F/up with cardiologist.  CT CAC score in 2021: zero.         Primary hypertension I10     Continue  Losartan 100 mg daily, monitor BP at home, let me know if consistently above 140/90.            Endocrine/Metabolic    Vitamin D deficiency E55.9     Continue Vit d supplement.         Age-related osteoporosis without current pathological fracture - Primary M81.0     Please maintain enough calcium in your diet:  4480-3793 mg daily (consume foods rich in calcium rather than taking only calcium supplement to meet daily calcium requirements), take daily Vitamin D supplement with meal. Average Vit D dose is 2000 international  units daily.  Weight bearing exercise routine is recommended.   Start Boniva monthly as discussed.  F/up in 3 months or sooner if needed.          Relevant Medications    ibandronate (Boniva) 150 mg tablet       Musculoskeletal and Injuries    Osteoarthritis M19.90     F/up with ortho.           It was a pleasure to see you today.  I would like to remind you about importance of a healthy lifestyle in order to improve your well-being and live longer.  Try to engage in physical activities for at least 150 minutes per week.  Eat about 10 servings of fruits and vegetables daily. My advice is 2 servings of fruits and 8 servings of vegetables.  For vegetables choose at least half of them green and at least half of them fresh.  Please avoid sugar, salt, fried food and saturated fat.    I spent a total of 31 minutes on the date of service for follow up visit, which included preparing to see the patient, face-to-face patient care, completing clinical documentation, obtaining and/or reviewing separately obtained history, performing a medically appropriate examination, counseling and educating the patient/family/caregiver, ordering medications, tests, or procedures, communicating with other health care providers (not separately reported), independently interpreting results (not separately reported), communicating results to the patient/family/caregiver, and care coordination (not separately reported).    F/up in 3 months or sooner if needed.

## 2024-04-11 NOTE — ASSESSMENT & PLAN NOTE
Please maintain enough calcium in your diet:  6402-1468 mg daily (consume foods rich in calcium rather than taking only calcium supplement to meet daily calcium requirements), take daily Vitamin D supplement with meal. Average Vit D dose is 2000 international units daily.  Weight bearing exercise routine is recommended.   Start Boniva monthly as discussed.  F/up in 3 months or sooner if needed.

## 2024-04-24 ENCOUNTER — OFFICE VISIT (OUTPATIENT)
Dept: UROLOGY | Facility: CLINIC | Age: 69
End: 2024-04-24
Payer: MEDICARE

## 2024-04-24 VITALS
BODY MASS INDEX: 22.31 KG/M2 | DIASTOLIC BLOOD PRESSURE: 87 MMHG | WEIGHT: 130 LBS | HEART RATE: 56 BPM | TEMPERATURE: 96.7 F | SYSTOLIC BLOOD PRESSURE: 149 MMHG

## 2024-04-24 DIAGNOSIS — N39.0 RECURRENT UTI: Primary | ICD-10-CM

## 2024-04-24 DIAGNOSIS — R33.9 INCOMPLETE EMPTYING OF BLADDER: ICD-10-CM

## 2024-04-24 LAB
POC APPEARANCE, URINE: CLEAR
POC BILIRUBIN, URINE: NEGATIVE
POC BLOOD, URINE: ABNORMAL
POC COLOR, URINE: YELLOW
POC GLUCOSE, URINE: NEGATIVE MG/DL
POC KETONES, URINE: NEGATIVE MG/DL
POC LEUKOCYTES, URINE: NEGATIVE
POC NITRITE,URINE: NEGATIVE
POC PH, URINE: 6 PH
POC PROTEIN, URINE: NEGATIVE MG/DL
POC SPECIFIC GRAVITY, URINE: 1.02
POC UROBILINOGEN, URINE: 0.2 EU/DL

## 2024-04-24 PROCEDURE — 1123F ACP DISCUSS/DSCN MKR DOCD: CPT | Performed by: NURSE PRACTITIONER

## 2024-04-24 PROCEDURE — 1159F MED LIST DOCD IN RCRD: CPT | Performed by: NURSE PRACTITIONER

## 2024-04-24 PROCEDURE — 99213 OFFICE O/P EST LOW 20 MIN: CPT | Performed by: NURSE PRACTITIONER

## 2024-04-24 PROCEDURE — 3079F DIAST BP 80-89 MM HG: CPT | Performed by: NURSE PRACTITIONER

## 2024-04-24 PROCEDURE — 3077F SYST BP >= 140 MM HG: CPT | Performed by: NURSE PRACTITIONER

## 2024-04-24 PROCEDURE — 1160F RVW MEDS BY RX/DR IN RCRD: CPT | Performed by: NURSE PRACTITIONER

## 2024-04-24 PROCEDURE — 81003 URINALYSIS AUTO W/O SCOPE: CPT | Performed by: NURSE PRACTITIONER

## 2024-04-24 NOTE — PROGRESS NOTES
04/24/24   [id number]    Recurrent UTI, pelvic pain     Subjective      HPI Cydney Lin is a 68 y.o. female who presents for follow up recurrent UTI, pelvic pain; cystoscopy by Dr. Pak in January normal; no sign of IC; no longer taking uribel; two weeks ago glass of white wine  and felt bladder, had to get up to urinate; only irritation 4 x in past 5 mos;     DTF 5-6 x, NTF 0-1 x, seldom UUI or EVANGELISTA, had been difficult for several mos w yeast infection;     Trace heme urine as before; rbc 1-2/hpf;   PVR 0 ml;     No pelvic pain, no dyspareunia; hydroxyzine only as needed, takes half and helps w anxiety, groggy, only when needed;    Hasn't used the estrogen cream, never felt made significant difference to use;     PMH, PSH, FH, SH reviewed;       Objective     /87   Pulse 56   Temp 35.9 °C (96.7 °F) (Temporal)   Wt 59 kg (130 lb)   BMI 22.31 kg/m²    Physical Exam  General: Appears comfortable and in no apparent distress, well nourished  Head: Normocephalic, atraumatic  Neck: trachea midline  Respiratory: respirations unlabored, no wheezes, and no use of accessory muscles  Cardiovascular: at rest no dyspnea, well perfused  Skin: no visible rashes or lesions  Neurologic: grossly intact, oriented to person, place, and time  Psychiatric: mood and affect appropriate  Musculoskeletal: in chair for appt. no difficulty w upper body movement      Assessment/Plan   Problem List Items Addressed This Visit    None  Visit Diagnoses       Recurrent UTI    -  Primary    Relevant Orders    POCT UA Automated manually resulted (Completed)    Post-Void Residual (Completed)    Incomplete emptying of bladder        Relevant Orders    Post-Void Residual (Completed)          Orders Placed This Encounter   Procedures    Post-Void Residual    POCT UA Automated manually resulted     Order Specific Question:   Release result to [employer]     Answer:   Immediate [1]     Prelief to use before triggers ([employer])   Monitor for bladder  irritants 3 day diary  Uribel as needed, hydroxyzine only periodically  Estrogen cream 2-3 x per week  Nurse line [phone]    6 mos follow up        VIKRAM Mi-CNP  Lab Results   Component Value Date    GLUCOSE 85 02/07/2024    CALCIUM 9.1 02/07/2024     02/07/2024    K 4.5 02/07/2024    CO2 31 02/07/2024     02/07/2024    BUN 16 02/07/2024    CREATININE 0.72 02/07/2024

## 2024-04-24 NOTE — PATIENT INSTRUCTIONS
Prelief to use before triggers ([employer])   Monitor for bladder irritants 3 day diary  Uribel as needed, hydroxyzine only periodically  Estrogen cream 2-3 x per week  Nurse line [phone]    6 mos follow up

## 2024-04-29 ENCOUNTER — TELEPHONE (OUTPATIENT)
Dept: PRIMARY CARE | Facility: CLINIC | Age: 69
End: 2024-04-29
Payer: MEDICARE

## 2024-07-22 ENCOUNTER — APPOINTMENT (OUTPATIENT)
Dept: PRIMARY CARE | Facility: CLINIC | Age: 69
End: 2024-07-22
Payer: MEDICARE

## 2024-07-22 VITALS
HEART RATE: 67 BPM | BODY MASS INDEX: 21.87 KG/M2 | WEIGHT: 127.4 LBS | SYSTOLIC BLOOD PRESSURE: 130 MMHG | TEMPERATURE: 98 F | OXYGEN SATURATION: 98 % | RESPIRATION RATE: 16 BRPM | DIASTOLIC BLOOD PRESSURE: 80 MMHG

## 2024-07-22 DIAGNOSIS — R05.9 COUGH, UNSPECIFIED TYPE: ICD-10-CM

## 2024-07-22 DIAGNOSIS — M81.0 AGE-RELATED OSTEOPOROSIS WITHOUT CURRENT PATHOLOGICAL FRACTURE: ICD-10-CM

## 2024-07-22 DIAGNOSIS — Z77.120 MOLD EXPOSURE: Primary | ICD-10-CM

## 2024-07-22 DIAGNOSIS — E55.9 VITAMIN D DEFICIENCY: ICD-10-CM

## 2024-07-22 DIAGNOSIS — I10 PRIMARY HYPERTENSION: ICD-10-CM

## 2024-07-22 DIAGNOSIS — Z77.011 LEAD EXPOSURE: ICD-10-CM

## 2024-07-22 DIAGNOSIS — E78.2 MIXED HYPERLIPIDEMIA: ICD-10-CM

## 2024-07-22 PROCEDURE — 1160F RVW MEDS BY RX/DR IN RCRD: CPT | Performed by: INTERNAL MEDICINE

## 2024-07-22 PROCEDURE — 1123F ACP DISCUSS/DSCN MKR DOCD: CPT | Performed by: INTERNAL MEDICINE

## 2024-07-22 PROCEDURE — 3075F SYST BP GE 130 - 139MM HG: CPT | Performed by: INTERNAL MEDICINE

## 2024-07-22 PROCEDURE — 1159F MED LIST DOCD IN RCRD: CPT | Performed by: INTERNAL MEDICINE

## 2024-07-22 PROCEDURE — 99214 OFFICE O/P EST MOD 30 MIN: CPT | Performed by: INTERNAL MEDICINE

## 2024-07-22 PROCEDURE — 1036F TOBACCO NON-USER: CPT | Performed by: INTERNAL MEDICINE

## 2024-07-22 PROCEDURE — 3079F DIAST BP 80-89 MM HG: CPT | Performed by: INTERNAL MEDICINE

## 2024-07-22 ASSESSMENT — ENCOUNTER SYMPTOMS
MYALGIAS: 0
DIARRHEA: 0
SLEEP DISTURBANCE: 0
DYSURIA: 0
PSYCHIATRIC NEGATIVE: 1
WEAKNESS: 0
EYE REDNESS: 0
WOUND: 0
SINUS PRESSURE: 0
ACTIVITY CHANGE: 0
ADENOPATHY: 0
CARDIOVASCULAR NEGATIVE: 1
EYES NEGATIVE: 1
SORE THROAT: 0
BLOOD IN STOOL: 0
COLOR CHANGE: 0
DIZZINESS: 0
TROUBLE SWALLOWING: 0
POLYDIPSIA: 0
LIGHT-HEADEDNESS: 0
HEMATOLOGIC/LYMPHATIC NEGATIVE: 1
NECK STIFFNESS: 0
ABDOMINAL PAIN: 0
GASTROINTESTINAL NEGATIVE: 1
TREMORS: 0
RESPIRATORY NEGATIVE: 1
ARTHRALGIAS: 0
EYE PAIN: 0
ENDOCRINE NEGATIVE: 1
VOICE CHANGE: 0
VOMITING: 0
PALPITATIONS: 0
NUMBNESS: 0
UNEXPECTED WEIGHT CHANGE: 0
ALLERGIC/IMMUNOLOGIC NEGATIVE: 1
CONFUSION: 0
JOINT SWELLING: 0
AGITATION: 0
CONSTIPATION: 0
DIFFICULTY URINATING: 0
FACIAL ASYMMETRY: 0
EYE DISCHARGE: 0
CHEST TIGHTNESS: 0
MUSCULOSKELETAL NEGATIVE: 1
APPETITE CHANGE: 0
NECK PAIN: 0
NAUSEA: 0
BACK PAIN: 0
SINUS PAIN: 0
NEUROLOGICAL NEGATIVE: 1
WHEEZING: 0
HEADACHES: 0
BRUISES/BLEEDS EASILY: 0
FREQUENCY: 0
COUGH: 0
SHORTNESS OF BREATH: 0
NERVOUS/ANXIOUS: 0
HALLUCINATIONS: 0
POLYPHAGIA: 0
CONSTITUTIONAL NEGATIVE: 1
STRIDOR: 0
FLANK PAIN: 0
SEIZURES: 0
SPEECH DIFFICULTY: 0

## 2024-07-22 ASSESSMENT — PATIENT HEALTH QUESTIONNAIRE - PHQ9
1. LITTLE INTEREST OR PLEASURE IN DOING THINGS: NOT AT ALL
SUM OF ALL RESPONSES TO PHQ9 QUESTIONS 1 AND 2: 0
2. FEELING DOWN, DEPRESSED OR HOPELESS: NOT AT ALL
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
## 2024-07-22 NOTE — ASSESSMENT & PLAN NOTE
Please maintain enough calcium in your diet:  4275-4831 mg daily (consume foods rich in calcium rather than taking only calcium supplement to meet daily calcium requirements), take daily Vitamin D supplement with meal. Average Vit D dose is 2000 international units daily.  Weight bearing exercise routine is recommended.   Not able to tolerate Boniva, will see rheumatologist for further evaluation and treatment recommendations.   F/up in 3 months or sooner if needed.

## 2024-07-25 ENCOUNTER — TELEPHONE (OUTPATIENT)
Dept: PRIMARY CARE | Facility: CLINIC | Age: 69
End: 2024-07-25
Payer: MEDICARE

## 2024-07-25 DIAGNOSIS — M79.662 PAIN OF LEFT CALF: Primary | ICD-10-CM

## 2024-07-25 NOTE — TELEPHONE ENCOUNTER
venous duplex STAT WAS pierre. for 7/26/2024 at 9 am 2nd floor on [facility] , and advised to the ER FOR ANY SOB / or any chest pain

## 2024-07-25 NOTE — TELEPHONE ENCOUNTER
Pt of Dr Ambriz. Was just in and forgot to mention she was having a L leg calf pain that has been bothering pt since Monday. It feels like a constant cramp and it is not warm to touch. She stated they had just went to [place] and were in the car for a 9 hour drive to and from. Please advise. Thank you!

## 2024-07-26 ENCOUNTER — HOSPITAL ENCOUNTER (OUTPATIENT)
Dept: VASCULAR MEDICINE | Facility: CLINIC | Age: 69
Discharge: HOME | End: 2024-07-26
Payer: MEDICARE

## 2024-07-26 DIAGNOSIS — M79.605 PAIN IN LEFT LEG: ICD-10-CM

## 2024-07-26 DIAGNOSIS — M79.662 PAIN OF LEFT CALF: ICD-10-CM

## 2024-07-26 PROCEDURE — 93971 EXTREMITY STUDY: CPT | Performed by: INTERNAL MEDICINE

## 2024-07-26 PROCEDURE — 93971 EXTREMITY STUDY: CPT

## 2024-07-26 NOTE — RESULT ENCOUNTER NOTE
Please call with results and mail if requested:  Your recent venous ultrasound did not show evidence of blood clot in left leg.   Dr. Ambriz

## 2024-07-30 ENCOUNTER — LAB (OUTPATIENT)
Dept: LAB | Facility: LAB | Age: 69
End: 2024-07-30
Payer: MEDICARE

## 2024-07-30 ENCOUNTER — OFFICE VISIT (OUTPATIENT)
Dept: RHEUMATOLOGY | Facility: CLINIC | Age: 69
End: 2024-07-30
Payer: MEDICARE

## 2024-07-30 VITALS
WEIGHT: 129 LBS | BODY MASS INDEX: 22.02 KG/M2 | TEMPERATURE: 98.2 F | SYSTOLIC BLOOD PRESSURE: 164 MMHG | HEIGHT: 64 IN | HEART RATE: 57 BPM | DIASTOLIC BLOOD PRESSURE: 77 MMHG

## 2024-07-30 DIAGNOSIS — Z77.011 LEAD EXPOSURE: ICD-10-CM

## 2024-07-30 DIAGNOSIS — E07.9 THYROID CONDITION: ICD-10-CM

## 2024-07-30 DIAGNOSIS — M81.0 AGE-RELATED OSTEOPOROSIS WITHOUT CURRENT PATHOLOGICAL FRACTURE: Primary | ICD-10-CM

## 2024-07-30 DIAGNOSIS — M81.0 AGE-RELATED OSTEOPOROSIS WITHOUT CURRENT PATHOLOGICAL FRACTURE: ICD-10-CM

## 2024-07-30 LAB
25(OH)D3 SERPL-MCNC: 34 NG/ML (ref 30–100)
ALBUMIN SERPL BCP-MCNC: 4.7 G/DL (ref 3.4–5)
ALP SERPL-CCNC: 90 U/L (ref 33–136)
ALT SERPL W P-5'-P-CCNC: 36 U/L (ref 7–45)
ANION GAP SERPL CALC-SCNC: 14 MMOL/L (ref 10–20)
AST SERPL W P-5'-P-CCNC: 25 U/L (ref 9–39)
BILIRUB SERPL-MCNC: 0.5 MG/DL (ref 0–1.2)
BUN SERPL-MCNC: 15 MG/DL (ref 6–23)
CALCIUM SERPL-MCNC: 9.8 MG/DL (ref 8.6–10.6)
CHLORIDE SERPL-SCNC: 101 MMOL/L (ref 98–107)
CO2 SERPL-SCNC: 28 MMOL/L (ref 21–32)
CREAT SERPL-MCNC: 0.62 MG/DL (ref 0.5–1.05)
EGFRCR SERPLBLD CKD-EPI 2021: >90 ML/MIN/1.73M*2
GLUCOSE SERPL-MCNC: 82 MG/DL (ref 74–99)
LEAD BLD-MCNC: 2.6 UG/DL
MAGNESIUM SERPL-MCNC: 2.25 MG/DL (ref 1.6–2.4)
PHOSPHATE SERPL-MCNC: 4.8 MG/DL (ref 2.5–4.9)
POTASSIUM SERPL-SCNC: 4.7 MMOL/L (ref 3.5–5.3)
PROT SERPL-MCNC: 7.1 G/DL (ref 6.4–8.2)
SODIUM SERPL-SCNC: 138 MMOL/L (ref 136–145)
TSH SERPL-ACNC: 1.4 MIU/L (ref 0.44–3.98)

## 2024-07-30 PROCEDURE — 83970 ASSAY OF PARATHORMONE: CPT

## 2024-07-30 PROCEDURE — 36415 COLL VENOUS BLD VENIPUNCTURE: CPT

## 2024-07-30 PROCEDURE — 3078F DIAST BP <80 MM HG: CPT | Performed by: STUDENT IN AN ORGANIZED HEALTH CARE EDUCATION/TRAINING PROGRAM

## 2024-07-30 PROCEDURE — 84100 ASSAY OF PHOSPHORUS: CPT

## 2024-07-30 PROCEDURE — 3008F BODY MASS INDEX DOCD: CPT | Performed by: STUDENT IN AN ORGANIZED HEALTH CARE EDUCATION/TRAINING PROGRAM

## 2024-07-30 PROCEDURE — 1159F MED LIST DOCD IN RCRD: CPT | Performed by: STUDENT IN AN ORGANIZED HEALTH CARE EDUCATION/TRAINING PROGRAM

## 2024-07-30 PROCEDURE — 3077F SYST BP >= 140 MM HG: CPT | Performed by: STUDENT IN AN ORGANIZED HEALTH CARE EDUCATION/TRAINING PROGRAM

## 2024-07-30 PROCEDURE — 1036F TOBACCO NON-USER: CPT | Performed by: STUDENT IN AN ORGANIZED HEALTH CARE EDUCATION/TRAINING PROGRAM

## 2024-07-30 PROCEDURE — 1123F ACP DISCUSS/DSCN MKR DOCD: CPT | Performed by: STUDENT IN AN ORGANIZED HEALTH CARE EDUCATION/TRAINING PROGRAM

## 2024-07-30 PROCEDURE — 83655 ASSAY OF LEAD: CPT

## 2024-07-30 PROCEDURE — 80053 COMPREHEN METABOLIC PANEL: CPT

## 2024-07-30 PROCEDURE — 82306 VITAMIN D 25 HYDROXY: CPT

## 2024-07-30 PROCEDURE — 84443 ASSAY THYROID STIM HORMONE: CPT

## 2024-07-30 PROCEDURE — 99204 OFFICE O/P NEW MOD 45 MIN: CPT | Performed by: STUDENT IN AN ORGANIZED HEALTH CARE EDUCATION/TRAINING PROGRAM

## 2024-07-30 PROCEDURE — 1160F RVW MEDS BY RX/DR IN RCRD: CPT | Performed by: STUDENT IN AN ORGANIZED HEALTH CARE EDUCATION/TRAINING PROGRAM

## 2024-07-30 PROCEDURE — 83735 ASSAY OF MAGNESIUM: CPT

## 2024-07-30 NOTE — PROGRESS NOTES
Rheumatology New Outpatient  Note    Subjective   Cydney Lin is a 68 y.o. female presenting today for Osteoporosis.    History of Presenting Problem:   Cydney with PMHx of HLP, HTN, osteoporosis, vitamin d deficiency, anxiety, PTSD, and OA, is presenting today as a NP for OP    Current Treatment   Vitamin D3 2000 iu daily  Calcium supplements daily     Prior Treatment  Boniva 4/2024- one dose and didn't tolerate  (stomach issues and joint pain)        DEXA 1/2024:  Left femur neck -2.0  Left femur total -2.1  Right forearm lowest -4.2    History of falls / fractures: spine frx after a fall in 2005 s/p fusion surgery  Loss of height: no  Tobacco: no  Alcohol: ~3-4 times per week  Vitamin D: yes  Calcium: yes  Weight bearing exercise: yes  Previous or planned invasive jaw surgery: nothing recent or planned. She has TMJ arthritis  History of radiation: no  Chronic steroid use: no  GERD: yes  Stacie-en-y: no  Thyroid disease: no  Parathyroid disease: no  SSRI use: no  Warfarin use: no   PPI use: PRN  CKD / GFR <30: no  Parental hip fracture: no     Past Medical History:   Past Medical History:   Diagnosis Date    Hyperlipidemia     Personal history of urinary calculi 06/09/2021    History of nephrolithiasis       Allergies:   Allergies   Allergen Reactions    Lisinopril Cough    Vicodin [Hydrocodone-Acetaminophen] Rash       Medications:   Current Outpatient Medications:     hydrOXYzine HCL (Atarax) 25 mg tablet, Take 1 tablet (25 mg) by mouth once daily at bedtime., Disp: 30 tablet, Rfl: 7    losartan (Cozaar) 100 mg tablet, Take 1 tablet (100 mg) by mouth once daily., Disp: 90 tablet, Rfl: 3    methen-hyos-me blue-ba-salicyl (UribeL Tabs) 81.6-0.12-10.8 mg tablet, Take 1 capsule by mouth 4 times a day as needed (bladder discomfort). With water, Disp: 60 tablet, Rfl: 2    omeprazole OTC (PriLOSEC OTC) 20 mg EC tablet, Take 1 tablet (20 mg) by mouth once daily in the morning. Take before meals. Do not crush, chew, or  "split., Disp: , Rfl:     Review of Systems:   Constitutional: Denies fever, chills   Eyes: Denies dry eyes, pain in the eyes   ENT: Denies dry mouth, loss of taste, sores in the mouth  Cardiovascular: Denies chest pain, palpitations   Respiratory: Denies shortness of breath   Gastrointestinal: Denies heartburn   Integumentary: Denies photosensitivity, rash or lesions, Raynaud's   Neurological: Denies any numbness or tingling    MSK: As per HPI.     All 10 review of systems have been reviewed and are negative for complaint except as noted in the HPI    Objective   Physical Examination:  There were no vitals filed for this visit.  Growth %ile SmartLinks can only be used for patients less than 20 years old.  Ht Readings from Last 1 Encounters:   04/10/24 1.626 m (5' 4\")     Wt Readings from Last 1 Encounters:   07/22/24 57.8 kg (127 lb 6.4 oz)       General - NAD, sitting up in chair, well-groomed, pleasant, AAOx3  Head: Normocephalic, atraumatic  Eyes - PERRLA, EOMI. No conjunctiva injection.     Cardiovascular - Normal S1, S2. Regular rate and rhythm. No murmurs or rubs.  Lungs - Symmetric chest expansion. Clear to auscultation bilaterally.   Skin - No rashes or ulcers. Skin warm and dry. No erythema on bilateral cheeks.  Extremities - No edema, cyanosis ,or clubbing  Neurological - Alert and oriented x 3,  grossly intact. No focal deficit.  Musculoskeletal -  Cervical spine: No tenderness or limitation of movement  Lumbar spine: No tenderness or limitation of movement          Assessment/Plan   Cydney Lin is a 68 y.o. female with PMHx of HLP, HTN, osteoporosis, vitamin d deficiency, anxiety, PTSD, and OA  who is presenting today as a NP for OP    ##Osteoporosis:  -Risk factors for osteoporosis: advanced age, previous fractures, low body weight,, alcohol use,  -Labs reviewed from 2/2024: CMP normal, CBC normal, vitamin D normal, TSH normal  -Most recent DEXA scan 1/2024:  Left femur neck -2.0, Left femur total " -2.1, Right forearm lowest -4.2  -Patient is encouraged to increase dietary calcium intake to 1200 mg per day and vitamin D to 800 units daily  -Patient is encouraged to take supplemental calcium (Calcium citrate if PPI), and vitamin D  -Drug therapy needed: failed Boniva, send PA for Prolia after checking labs  -Encouraged to exercise for 30 minutes daily for 3-5 days per week  -Recommend weight-bearing aerobic exercises such as low impact aerobics, elliptical machine, walking, stair climbing, gardening, biking, and water exercises  -Recommend avoiding high impact exercises such as jumping, running or jogging or movements where you bend forward and twist the waist.  -Limit caffeine intake to 2-3 servings per day  -Advised to avoid smoking and alcohol intake    The assessment and plan, risk and benefits were discussed with the patient. All of the patients questions were answered and patient agrees to the plan.      Jose Guadalupe Dowd MD  Clinical   Department of Rheumatology   [facility]

## 2024-07-31 LAB — PTH-INTACT SERPL-MCNC: 60.5 PG/ML (ref 18.5–88)

## 2024-08-07 ENCOUNTER — APPOINTMENT (OUTPATIENT)
Dept: RHEUMATOLOGY | Facility: CLINIC | Age: 69
End: 2024-08-07
Payer: MEDICARE

## 2024-09-25 ENCOUNTER — APPOINTMENT (OUTPATIENT)
Dept: RHEUMATOLOGY | Facility: CLINIC | Age: 69
End: 2024-09-25
Payer: MEDICARE

## 2024-10-21 ENCOUNTER — APPOINTMENT (OUTPATIENT)
Dept: UROLOGY | Facility: CLINIC | Age: 69
End: 2024-10-21
Payer: MEDICARE

## 2024-10-21 VITALS
HEART RATE: 56 BPM | SYSTOLIC BLOOD PRESSURE: 164 MMHG | DIASTOLIC BLOOD PRESSURE: 85 MMHG | WEIGHT: 127.2 LBS | TEMPERATURE: 97.7 F | BODY MASS INDEX: 21.83 KG/M2

## 2024-10-21 DIAGNOSIS — R33.9 INCOMPLETE EMPTYING OF BLADDER: ICD-10-CM

## 2024-10-21 DIAGNOSIS — N39.0 RECURRENT UTI: Primary | ICD-10-CM

## 2024-10-21 LAB
POC APPEARANCE, URINE: CLEAR
POC BILIRUBIN, URINE: NEGATIVE
POC BLOOD, URINE: ABNORMAL
POC COLOR, URINE: YELLOW
POC GLUCOSE, URINE: NEGATIVE MG/DL
POC KETONES, URINE: NEGATIVE MG/DL
POC LEUKOCYTES, URINE: ABNORMAL
POC NITRITE,URINE: NEGATIVE
POC PH, URINE: 6.5 PH
POC PROTEIN, URINE: NEGATIVE MG/DL
POC SPECIFIC GRAVITY, URINE: 1.02
POC UROBILINOGEN, URINE: 0.2 EU/DL

## 2024-10-21 PROCEDURE — G2211 COMPLEX E/M VISIT ADD ON: HCPCS | Performed by: NURSE PRACTITIONER

## 2024-10-21 PROCEDURE — 99213 OFFICE O/P EST LOW 20 MIN: CPT | Performed by: NURSE PRACTITIONER

## 2024-10-21 PROCEDURE — 1123F ACP DISCUSS/DSCN MKR DOCD: CPT | Performed by: NURSE PRACTITIONER

## 2024-10-21 PROCEDURE — 81003 URINALYSIS AUTO W/O SCOPE: CPT | Performed by: NURSE PRACTITIONER

## 2024-10-21 PROCEDURE — 1159F MED LIST DOCD IN RCRD: CPT | Performed by: NURSE PRACTITIONER

## 2024-10-21 PROCEDURE — 3077F SYST BP >= 140 MM HG: CPT | Performed by: NURSE PRACTITIONER

## 2024-10-21 PROCEDURE — 3079F DIAST BP 80-89 MM HG: CPT | Performed by: NURSE PRACTITIONER

## 2024-10-21 NOTE — PROGRESS NOTES
10/21/24   [id number]    Recurrent UTI, pelvic pain     Subjective      HPI Cydney Lin is a 68 y.o. female who presents for follow up recurrent UTI, pelvic pain; last seen 4/24/24; doing well, kept journal noticed she always urinates before she leaves the house, has the uribel, hasn't needed it; hydroxyzine knocked her out, cut in half, hasn't taken in a while;     Creatinine 0.62, GFR > 90    Recap:  cystoscopy by Dr. Pak in January normal; no sign of IC;     DTF 5-6 x, NTF 0-1 x, seldom UUI or EVANGELISTA, had been difficult for several mos w yeast infection;     Trace heme urine today as before; rbc 1-2/hpf;   PVR 1 ml;     No pelvic pain, no dyspareunia; groggy, only when needed;    Never started estrogen cream, has it at home if desires to start    PMH, PSH, FH, SH reviewed;       Objective     /85   Pulse 56   Temp 36.5 °C (97.7 °F)   Wt 57.7 kg (127 lb 3.2 oz)   BMI 21.83 kg/m²    Physical Exam  General: Appears comfortable and in no apparent distress, well nourished  Head: Normocephalic, atraumatic  Neck: trachea midline  Respiratory: respirations unlabored, no wheezes, and no use of accessory muscles  Cardiovascular: at rest no dyspnea, well perfused  Skin: no visible rashes or lesions  Neurologic: grossly intact, oriented to person, place, and time  Psychiatric: mood and affect appropriate  Musculoskeletal: in chair for appt. no difficulty w upper body movement      Assessment/Plan   Problem List Items Addressed This Visit    None  Visit Diagnoses       Recurrent UTI    -  Primary    Relevant Orders    POCT UA Automated manually resulted (Completed)    Post-Void Residual (Completed)    Incomplete emptying of bladder        Relevant Orders    POCT UA Automated manually resulted (Completed)    Post-Void Residual (Completed)          Orders Placed This Encounter   Procedures    Post-Void Residual    POCT UA Automated manually resulted     Order Specific Question:   Release result to App Partner     Answer:    Immediate [1]     Prelief to use before triggers ([employer])   Uribel as needed, hydroxyzine only periodically  Estrogen cream 2-3 x per week, if wants to use  Nurse line [phone]    1 year follow up        Alison Hdz, APRN-CNP  Lab Results   Component Value Date    GLUCOSE 82 07/30/2024    CALCIUM 9.8 07/30/2024     07/30/2024    K 4.7 07/30/2024    CO2 28 07/30/2024     07/30/2024    BUN 15 07/30/2024    CREATININE 0.62 07/30/2024

## 2024-10-21 NOTE — PATIENT INSTRUCTIONS
Prelief to use before triggers ([employer] or local drug store over the counter)  Uribel as needed, hydroxyzine only periodically  Estrogen cream 2-3 x per week, if wants to use  Nurse line [phone]    1 year follow up

## 2024-11-04 PROBLEM — J30.9 ALLERGIC RHINITIS: Status: ACTIVE | Noted: 2024-11-04

## 2024-11-04 PROBLEM — R07.89 ATYPICAL CHEST PAIN: Status: RESOLVED | Noted: 2023-03-20 | Resolved: 2024-11-04

## 2024-11-04 PROBLEM — R05.9 COUGH: Status: RESOLVED | Noted: 2023-03-20 | Resolved: 2024-11-04

## 2024-11-04 PROBLEM — N39.0 URINARY TRACT INFECTION WITHOUT HEMATURIA: Status: RESOLVED | Noted: 2023-09-11 | Resolved: 2024-11-04

## 2024-11-04 PROBLEM — R19.7 DIARRHEA: Status: RESOLVED | Noted: 2023-03-20 | Resolved: 2024-11-04

## 2024-11-04 NOTE — PROGRESS NOTES
"  Subjective   Patient ID:   [id number]   Cydney Lin is a 68 y.o. female who presents for Allergies.    Chief Complaint   Patient presents with    Allergies      Patient presents for F/U of cough and mold exposure, referred by Verena Montiel MD PhD, PCP.    Patient was last seen here 4-18-23 and SPT at that time showed reaction to ragweed.  At that time, she was referred to Goran Bertrand MD or Chu Barillas DO, for evaluation of vocal cords.  Dr. Bertrand ordered an MBS performed 7-19-23 that was normal. (She doesn't recall undergoing a rhinoscopy or MBS. She also does not believe she had a virtual appointment with Dr. Bertrand. Documentation shows that she had a normal MBS)     Patient reports they did work at their home and thinks she may have been exposed to mold without masks.  She has since experienced fatigue, throat-clearing and a throat tickle.  Her daughter's home in [place] is having mold issues and she told patient she should be tested for mold.  She is concerned regarding mold toxicity.  She notes the home is 111 years old and has several house plants.  She saw Dr. Bertrand who started her on reflux medication but she does not recall the laryngoscopy, the followup visit or the MBS.     Patient also feels she has had lead exposure and asking if testing can be done for that.  She has been off antihistamines the last five days.    Review of Systems   Constitutional:  Positive for fatigue.   Respiratory:  Positive for cough (with throat tickle and throat-clearing).      Objective   Pulse 60   Ht 1.638 m (5' 4.5\")   Wt 57.6 kg (127 lb)   BMI 21.46 kg/m²      Physical Exam  Constitutional:       Appearance: Normal appearance.   HENT:      Head: Normocephalic and atraumatic.      Right Ear: External ear normal. There is no impacted cerumen.      Left Ear: External ear normal. There is no impacted cerumen.      Nose: No congestion or rhinorrhea.   Eyes:      Extraocular Movements: Extraocular movements " intact.      Conjunctiva/sclera: Conjunctivae normal.      Pupils: Pupils are equal, round, and reactive to light.   Cardiovascular:      Rate and Rhythm: Normal rate and regular rhythm.      Heart sounds: No murmur heard.     No friction rub. No gallop.   Pulmonary:      Effort: No respiratory distress.      Breath sounds: No wheezing, rhonchi or rales.   Skin:     General: Skin is warm and dry.   Neurological:      Mental Status: She is alert.   Psychiatric:         Mood and Affect: Mood normal.         Behavior: Behavior normal.       Assessment/Plan     Acute cough  Since they did work at their home, she thinks she may have been exposed to mold.  She is not concerned about an allergy, but she is concerned that she has mold toxicity.    Skin testing to molds is negative.    Recommended purchasing a home mold test in bedrooms and main living areas at [facility] or [facility].  If the dish is more than half covered with spots, consider mailing it in for analysis.     For mold toxicity concerns, I recommend you see [facility].      By signing my name below, I, Jeanne Samuels, attest that this documentation has been prepared under the direction and in the presence of Rosalia Benavidez MD.  All medical record entries made by the Scribe were at my direction and personally dictated by me. I have reviewed the chart and agree that the record accurately reflects my personal performance of the history, physical exam, discussion and plan.

## 2024-11-05 ENCOUNTER — APPOINTMENT (OUTPATIENT)
Dept: ALLERGY | Facility: CLINIC | Age: 69
End: 2024-11-05
Payer: MEDICARE

## 2024-11-05 VITALS — HEIGHT: 65 IN | BODY MASS INDEX: 21.16 KG/M2 | HEART RATE: 60 BPM | WEIGHT: 127 LBS

## 2024-11-05 DIAGNOSIS — R05.1 ACUTE COUGH: Primary | ICD-10-CM

## 2024-11-05 PROCEDURE — 95004 PERQ TESTS W/ALRGNC XTRCS: CPT | Performed by: ALLERGY & IMMUNOLOGY

## 2024-11-05 PROCEDURE — 99214 OFFICE O/P EST MOD 30 MIN: CPT | Performed by: ALLERGY & IMMUNOLOGY

## 2024-11-05 ASSESSMENT — ENCOUNTER SYMPTOMS
COUGH: 1
FATIGUE: 1

## 2024-11-05 NOTE — PATIENT INSTRUCTIONS
Skin testing to molds is negative.    Recommended purchasing a home mold test in bedrooms and main living areas at [place] or [employer].  If the dish is more than half covered with spots, consider mailing it in for analysis.     For mold toxicity concerns, I recommend you see [facility].

## 2024-11-07 NOTE — ASSESSMENT & PLAN NOTE
Since they did work at their home, she thinks she may have been exposed to mold.  She is not concerned about an allergy, but she is concerned that she has mold toxicity.    Skin testing to molds is negative.    Recommended purchasing a home mold test in bedrooms and main living areas at [place] or [facility].  If the dish is more than half covered with spots, consider mailing it in for analysis.     For mold toxicity concerns, I recommend you see [facility].

## 2024-12-02 DIAGNOSIS — I10 PRIMARY HYPERTENSION: ICD-10-CM

## 2024-12-02 RX ORDER — LOSARTAN POTASSIUM 100 MG/1
100 TABLET ORAL DAILY
Qty: 90 TABLET | Refills: 3 | Status: SHIPPED | OUTPATIENT
Start: 2024-12-02 | End: 2025-11-27

## 2025-01-13 ENCOUNTER — APPOINTMENT (OUTPATIENT)
Dept: PRIMARY CARE | Facility: CLINIC | Age: 70
End: 2025-01-13
Payer: MEDICARE

## 2025-01-13 VITALS
HEART RATE: 56 BPM | TEMPERATURE: 98 F | RESPIRATION RATE: 16 BRPM | WEIGHT: 127.2 LBS | DIASTOLIC BLOOD PRESSURE: 70 MMHG | OXYGEN SATURATION: 100 % | HEIGHT: 65 IN | SYSTOLIC BLOOD PRESSURE: 132 MMHG | BODY MASS INDEX: 21.19 KG/M2

## 2025-01-13 DIAGNOSIS — Z00.00 ROUTINE GENERAL MEDICAL EXAMINATION AT HEALTH CARE FACILITY: Primary | ICD-10-CM

## 2025-01-13 DIAGNOSIS — E55.9 VITAMIN D DEFICIENCY: ICD-10-CM

## 2025-01-13 DIAGNOSIS — Z12.31 ENCOUNTER FOR SCREENING MAMMOGRAM FOR BREAST CANCER: ICD-10-CM

## 2025-01-13 DIAGNOSIS — M81.0 AGE-RELATED OSTEOPOROSIS WITHOUT CURRENT PATHOLOGICAL FRACTURE: ICD-10-CM

## 2025-01-13 DIAGNOSIS — K52.9 COLITIS: ICD-10-CM

## 2025-01-13 DIAGNOSIS — I10 PRIMARY HYPERTENSION: ICD-10-CM

## 2025-01-13 DIAGNOSIS — E78.2 MIXED HYPERLIPIDEMIA: ICD-10-CM

## 2025-01-13 PROCEDURE — 1159F MED LIST DOCD IN RCRD: CPT | Performed by: INTERNAL MEDICINE

## 2025-01-13 PROCEDURE — 3008F BODY MASS INDEX DOCD: CPT | Performed by: INTERNAL MEDICINE

## 2025-01-13 PROCEDURE — 99214 OFFICE O/P EST MOD 30 MIN: CPT | Performed by: INTERNAL MEDICINE

## 2025-01-13 PROCEDURE — 1160F RVW MEDS BY RX/DR IN RCRD: CPT | Performed by: INTERNAL MEDICINE

## 2025-01-13 PROCEDURE — 99397 PER PM REEVAL EST PAT 65+ YR: CPT | Performed by: INTERNAL MEDICINE

## 2025-01-13 PROCEDURE — 3078F DIAST BP <80 MM HG: CPT | Performed by: INTERNAL MEDICINE

## 2025-01-13 PROCEDURE — 1036F TOBACCO NON-USER: CPT | Performed by: INTERNAL MEDICINE

## 2025-01-13 PROCEDURE — 3075F SYST BP GE 130 - 139MM HG: CPT | Performed by: INTERNAL MEDICINE

## 2025-01-13 PROCEDURE — 1123F ACP DISCUSS/DSCN MKR DOCD: CPT | Performed by: INTERNAL MEDICINE

## 2025-01-13 PROCEDURE — 1158F ADVNC CARE PLAN TLK DOCD: CPT | Performed by: INTERNAL MEDICINE

## 2025-01-13 PROCEDURE — G0439 PPPS, SUBSEQ VISIT: HCPCS | Performed by: INTERNAL MEDICINE

## 2025-01-13 PROCEDURE — 1170F FXNL STATUS ASSESSED: CPT | Performed by: INTERNAL MEDICINE

## 2025-01-13 RX ORDER — BUDESONIDE 3 MG/1
CAPSULE, COATED PELLETS ORAL
COMMUNITY
Start: 2024-12-05

## 2025-01-13 ASSESSMENT — ENCOUNTER SYMPTOMS
SINUS PRESSURE: 0
LIGHT-HEADEDNESS: 0
FLANK PAIN: 0
OCCASIONAL FEELINGS OF UNSTEADINESS: 0
EYE PAIN: 0
HEMATOLOGIC/LYMPHATIC NEGATIVE: 1
HALLUCINATIONS: 0
ADENOPATHY: 0
ALLERGIC/IMMUNOLOGIC NEGATIVE: 1
DIARRHEA: 0
VOICE CHANGE: 0
EYES NEGATIVE: 1
CARDIOVASCULAR NEGATIVE: 1
BRUISES/BLEEDS EASILY: 0
RESPIRATORY NEGATIVE: 1
BACK PAIN: 0
AGITATION: 0
STRIDOR: 0
ENDOCRINE NEGATIVE: 1
WEAKNESS: 0
CHEST TIGHTNESS: 0
MYALGIAS: 0
NAUSEA: 0
EYE REDNESS: 0
ARTHRALGIAS: 0
CONFUSION: 0
NERVOUS/ANXIOUS: 0
SPEECH DIFFICULTY: 0
DEPRESSION: 0
NECK STIFFNESS: 0
TROUBLE SWALLOWING: 0
CONSTITUTIONAL NEGATIVE: 1
FREQUENCY: 0
EYE DISCHARGE: 0
NECK PAIN: 0
HEADACHES: 0
SLEEP DISTURBANCE: 0
PSYCHIATRIC NEGATIVE: 1
MUSCULOSKELETAL NEGATIVE: 1
WHEEZING: 0
NEUROLOGICAL NEGATIVE: 1
DIZZINESS: 0
WOUND: 0
SHORTNESS OF BREATH: 0
DIFFICULTY URINATING: 0
UNEXPECTED WEIGHT CHANGE: 0
VOMITING: 0
CONSTIPATION: 0
CHILLS: 0
COUGH: 0
TREMORS: 0
SEIZURES: 0
COLOR CHANGE: 0
APPETITE CHANGE: 0
PALPITATIONS: 0
DYSURIA: 0
GASTROINTESTINAL NEGATIVE: 1
FACIAL ASYMMETRY: 0
FEVER: 0
SINUS PAIN: 0
POLYDIPSIA: 0
BLOOD IN STOOL: 0
SORE THROAT: 0
ACTIVITY CHANGE: 0
NUMBNESS: 0
POLYPHAGIA: 0
LOSS OF SENSATION IN FEET: 0
ABDOMINAL PAIN: 0
JOINT SWELLING: 0

## 2025-01-13 ASSESSMENT — ACTIVITIES OF DAILY LIVING (ADL)
DOING_HOUSEWORK: INDEPENDENT
GROCERY_SHOPPING: INDEPENDENT
TAKING_MEDICATION: INDEPENDENT
BATHING: INDEPENDENT
MANAGING_FINANCES: INDEPENDENT
DRESSING: INDEPENDENT
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
## 2025-01-13 NOTE — ASSESSMENT & PLAN NOTE
Please maintain enough calcium in your diet:  0219-2359 mg daily (consume foods rich in calcium rather than taking only calcium supplement to meet daily calcium requirements), take daily Vitamin D supplement with meal. Average Vit D dose is 2000 international units daily.  Weight bearing exercise routine is recommended.   Not able to tolerate Boniva, saw  rheumatologist for further evaluation and treatment recommendations.       Orders:    Vitamin D 25-Hydroxy,Total (for eval of Vitamin D levels); Future

## 2025-01-13 NOTE — ASSESSMENT & PLAN NOTE
Low cholesterol diet is advised. F/up with cardiologist.  CT CAC score in 2021: zero.    Orders:    Comprehensive Metabolic Panel; Future    Lipid Panel; Future    TSH with reflex to Free T4 if abnormal; Future

## 2025-01-13 NOTE — ASSESSMENT & PLAN NOTE
Continue  Losartan 100 mg daily, monitor BP at home, let me know if consistently above 140/90.

## 2025-01-14 ENCOUNTER — LAB (OUTPATIENT)
Dept: LAB | Facility: LAB | Age: 70
End: 2025-01-14
Payer: MEDICARE

## 2025-01-14 DIAGNOSIS — M81.0 AGE-RELATED OSTEOPOROSIS WITHOUT CURRENT PATHOLOGICAL FRACTURE: ICD-10-CM

## 2025-01-14 DIAGNOSIS — E78.2 MIXED HYPERLIPIDEMIA: ICD-10-CM

## 2025-01-14 DIAGNOSIS — K52.9 COLITIS: ICD-10-CM

## 2025-01-14 LAB
25(OH)D3 SERPL-MCNC: 24 NG/ML (ref 30–100)
ALBUMIN SERPL BCP-MCNC: 4.3 G/DL (ref 3.4–5)
ALP SERPL-CCNC: 93 U/L (ref 33–136)
ALT SERPL W P-5'-P-CCNC: 71 U/L (ref 7–45)
ANION GAP SERPL CALC-SCNC: 13 MMOL/L (ref 10–20)
AST SERPL W P-5'-P-CCNC: 40 U/L (ref 9–39)
BILIRUB SERPL-MCNC: 0.6 MG/DL (ref 0–1.2)
BUN SERPL-MCNC: 15 MG/DL (ref 6–23)
CALCIUM SERPL-MCNC: 9.5 MG/DL (ref 8.6–10.6)
CHLORIDE SERPL-SCNC: 104 MMOL/L (ref 98–107)
CHOLEST SERPL-MCNC: 254 MG/DL (ref 0–199)
CHOLESTEROL/HDL RATIO: 2.7
CO2 SERPL-SCNC: 29 MMOL/L (ref 21–32)
CREAT SERPL-MCNC: 0.6 MG/DL (ref 0.5–1.05)
EGFRCR SERPLBLD CKD-EPI 2021: >90 ML/MIN/1.73M*2
ERYTHROCYTE [DISTWIDTH] IN BLOOD BY AUTOMATED COUNT: 13.4 % (ref 11.5–14.5)
GLUCOSE SERPL-MCNC: 98 MG/DL (ref 74–99)
HCT VFR BLD AUTO: 42.2 % (ref 36–46)
HDLC SERPL-MCNC: 95.8 MG/DL
HGB BLD-MCNC: 14.1 G/DL (ref 12–16)
LDLC SERPL CALC-MCNC: 146 MG/DL
MCH RBC QN AUTO: 29.3 PG (ref 26–34)
MCHC RBC AUTO-ENTMCNC: 33.4 G/DL (ref 32–36)
MCV RBC AUTO: 88 FL (ref 80–100)
NON HDL CHOLESTEROL: 158 MG/DL (ref 0–149)
NRBC BLD-RTO: 0 /100 WBCS (ref 0–0)
PLATELET # BLD AUTO: 246 X10*3/UL (ref 150–450)
POTASSIUM SERPL-SCNC: 5.3 MMOL/L (ref 3.5–5.3)
PROT SERPL-MCNC: 7 G/DL (ref 6.4–8.2)
RBC # BLD AUTO: 4.81 X10*6/UL (ref 4–5.2)
SODIUM SERPL-SCNC: 141 MMOL/L (ref 136–145)
TRIGL SERPL-MCNC: 60 MG/DL (ref 0–149)
TSH SERPL-ACNC: 2.13 MIU/L (ref 0.44–3.98)
VLDL: 12 MG/DL (ref 0–40)
WBC # BLD AUTO: 5.7 X10*3/UL (ref 4.4–11.3)

## 2025-01-14 PROCEDURE — 80053 COMPREHEN METABOLIC PANEL: CPT

## 2025-01-14 PROCEDURE — 84443 ASSAY THYROID STIM HORMONE: CPT

## 2025-01-14 PROCEDURE — 82306 VITAMIN D 25 HYDROXY: CPT

## 2025-01-14 PROCEDURE — 80061 LIPID PANEL: CPT

## 2025-01-14 PROCEDURE — 85027 COMPLETE CBC AUTOMATED: CPT

## 2025-01-19 NOTE — RESULT ENCOUNTER NOTE
Please mail results:  Your recent lab work was acceptable except elevated cholesterol, liver enzymes and low Vit D level.  Low cholesterol diet is advised, please increase  dose of Vit d supplement by additional 1000 international units daily.  Please follow up with your gastroenterologist regarding elevated liver enzymes.  We will discuss details during your next office visit. Please keep your next appointment as scheduled. Dr. Ambriz

## 2025-02-10 ENCOUNTER — HOSPITAL ENCOUNTER (OUTPATIENT)
Dept: RADIOLOGY | Facility: CLINIC | Age: 70
Discharge: HOME | End: 2025-02-10
Payer: MEDICARE

## 2025-02-10 VITALS — HEIGHT: 65 IN | BODY MASS INDEX: 21.19 KG/M2 | WEIGHT: 127.2 LBS

## 2025-02-10 DIAGNOSIS — Z12.31 ENCOUNTER FOR SCREENING MAMMOGRAM FOR BREAST CANCER: ICD-10-CM

## 2025-02-10 PROCEDURE — 77067 SCR MAMMO BI INCL CAD: CPT | Performed by: RADIOLOGY

## 2025-02-10 PROCEDURE — 77063 BREAST TOMOSYNTHESIS BI: CPT | Performed by: RADIOLOGY

## 2025-02-10 PROCEDURE — 77067 SCR MAMMO BI INCL CAD: CPT

## 2025-02-20 ENCOUNTER — TELEPHONE (OUTPATIENT)
Dept: PRIMARY CARE | Facility: CLINIC | Age: 70
End: 2025-02-20
Payer: MEDICARE

## 2025-02-20 DIAGNOSIS — F41.9 ANXIETY DISORDER, UNSPECIFIED TYPE: Primary | ICD-10-CM

## 2025-02-20 RX ORDER — ALPRAZOLAM 0.25 MG/1
0.25 TABLET ORAL 3 TIMES DAILY PRN
Qty: 6 TABLET | Refills: 0 | Status: SHIPPED | OUTPATIENT
Start: 2025-02-20 | End: 2025-02-22

## 2025-02-20 NOTE — TELEPHONE ENCOUNTER
Pt called asking for rx for medication for flying.   She is flying out Monday due to her sister is dying.  Approximate 5 pills    Pharmacy=  [facility] in [place]    Please call pt at  [phone]

## 2025-05-23 DIAGNOSIS — I10 PRIMARY HYPERTENSION: ICD-10-CM

## 2025-05-23 RX ORDER — LOSARTAN POTASSIUM 100 MG/1
100 TABLET ORAL DAILY
Qty: 90 TABLET | Refills: 3 | Status: SHIPPED | OUTPATIENT
Start: 2025-05-23 | End: 2026-05-18

## 2025-06-23 ENCOUNTER — APPOINTMENT (OUTPATIENT)
Dept: PRIMARY CARE | Facility: CLINIC | Age: 70
End: 2025-06-23
Payer: MEDICARE

## 2025-06-23 ENCOUNTER — HOSPITAL ENCOUNTER (OUTPATIENT)
Dept: RADIOLOGY | Facility: CLINIC | Age: 70
Discharge: HOME | End: 2025-06-23
Payer: MEDICARE

## 2025-06-23 ENCOUNTER — TELEPHONE (OUTPATIENT)
Dept: PRIMARY CARE | Facility: CLINIC | Age: 70
End: 2025-06-23

## 2025-06-23 VITALS
WEIGHT: 122 LBS | HEART RATE: 76 BPM | TEMPERATURE: 98.5 F | SYSTOLIC BLOOD PRESSURE: 130 MMHG | OXYGEN SATURATION: 98 % | BODY MASS INDEX: 20.62 KG/M2 | DIASTOLIC BLOOD PRESSURE: 82 MMHG

## 2025-06-23 DIAGNOSIS — M89.319 CLAVICULAR ENLARGEMENT: ICD-10-CM

## 2025-06-23 DIAGNOSIS — M89.319 CLAVICULAR ENLARGEMENT: Primary | ICD-10-CM

## 2025-06-23 DIAGNOSIS — E04.1 THYROID NODULE: ICD-10-CM

## 2025-06-23 PROCEDURE — 3075F SYST BP GE 130 - 139MM HG: CPT | Performed by: NURSE PRACTITIONER

## 2025-06-23 PROCEDURE — 73000 X-RAY EXAM OF COLLAR BONE: CPT | Mod: LT

## 2025-06-23 PROCEDURE — 73000 X-RAY EXAM OF COLLAR BONE: CPT | Mod: LEFT SIDE

## 2025-06-23 PROCEDURE — 99213 OFFICE O/P EST LOW 20 MIN: CPT | Performed by: NURSE PRACTITIONER

## 2025-06-23 PROCEDURE — 3079F DIAST BP 80-89 MM HG: CPT | Performed by: NURSE PRACTITIONER

## 2025-06-23 ASSESSMENT — ENCOUNTER SYMPTOMS
DIZZINESS: 0
SORE THROAT: 0
LIGHT-HEADEDNESS: 0
VOICE CHANGE: 0
SHORTNESS OF BREATH: 0
HEADACHES: 0
FEVER: 0
WEAKNESS: 0
CHILLS: 0
COUGH: 0
TROUBLE SWALLOWING: 0
VOMITING: 0
NUMBNESS: 1
MYALGIAS: 0
PALPITATIONS: 0
DIARRHEA: 1
NAUSEA: 0
FATIGUE: 0
ARTHRALGIAS: 0

## 2025-06-23 NOTE — PROGRESS NOTES
Subjective   Patient ID: Cydney Lin is a 69 y.o. female who presents for No chief complaint on file..    HPI   Noticed mass on left side of collar bone 2 weeks ago.  Feels with pressure with lump.  Occasional numbness and tingling in left arm, x 1 year. Has been worse lately.    Hx of shoulder replacement. No recent injury.   10 years ago had mass on in front of left ear that she had removed.   Sister had Hx of rare small intestinal cancer.       Review of Systems   Constitutional:  Negative for chills, fatigue and fever.   HENT:  Negative for sore throat, trouble swallowing and voice change.    Respiratory:  Negative for cough and shortness of breath.    Cardiovascular:  Negative for chest pain, palpitations and leg swelling.   Gastrointestinal:  Positive for diarrhea. Negative for nausea and vomiting.   Musculoskeletal:  Negative for arthralgias and myalgias.   Skin:  Negative for rash.   Neurological:  Positive for numbness. Negative for dizziness, weakness, light-headedness and headaches.   All other systems reviewed and are negative.      Objective   /82   Pulse 76   Temp 36.9 °C (98.5 °F)   Wt 55.3 kg (122 lb)   SpO2 98%   BMI 20.62 kg/m²     Physical Exam  Vitals reviewed.   Constitutional:       Appearance: Normal appearance. She is normal weight.   Eyes:      Conjunctiva/sclera: Conjunctivae normal.   Neck:      Thyroid: Thyroid mass (1cm hard mobile mass.) present.     Cardiovascular:      Rate and Rhythm: Normal rate and regular rhythm.      Heart sounds: Normal heart sounds.   Pulmonary:      Effort: Pulmonary effort is normal.      Breath sounds: Normal breath sounds.   Musculoskeletal:         General: Swelling (swelling at sternoclavicular joint. No mobile mass.) present. No tenderness.      Cervical back: Neck supple.   Skin:     General: Skin is warm and dry.   Neurological:      General: No focal deficit present.      Mental Status: She is alert and oriented to person, place, and  time.   Psychiatric:         Mood and Affect: Mood normal.       Assessment/Plan   Assessment & Plan  Clavicular enlargement    Orders:    XR clavicle left; Future    -xray to rule out abnormality in joint/mass in area.   -monitor size of mass. If contiunes to enlarge, notify office.   -Ice to decrease swelling.     Thyroid nodule    Orders:    US thyroid; Future    -Recent TSH in January 2.13  -Has had a 5lb weight loss, but has recently returned from a 1 month hiking trip where she was more active and eating less.   -Due to palpable mass will order thyroid US.   -No need to repeat TSH at this time.   -Call or return to office prn if these symptoms worsen or fail to improve as anticipated.  -Follow up with Dr. Ambriz as scheduled.               5:03 PM: Left VM for patient with normal xray results.

## 2025-06-24 ENCOUNTER — HOSPITAL ENCOUNTER (OUTPATIENT)
Dept: RADIOLOGY | Facility: CLINIC | Age: 70
Discharge: HOME | End: 2025-06-24
Payer: MEDICARE

## 2025-06-24 DIAGNOSIS — E04.1 THYROID NODULE: ICD-10-CM

## 2025-06-24 PROCEDURE — 76536 US EXAM OF HEAD AND NECK: CPT

## 2025-06-25 ENCOUNTER — TELEPHONE (OUTPATIENT)
Dept: PRIMARY CARE | Facility: CLINIC | Age: 70
End: 2025-06-25
Payer: MEDICARE

## 2025-06-25 NOTE — TELEPHONE ENCOUNTER
Result Communication    Resulted Orders   US thyroid    Narrative    Interpreted By:  Chan Trevino,   STUDY:  US THYROID;  6/24/2025 9:35 am      INDICATION:  Signs/Symptoms:Right upper thyroid nodule.      COMPARISON:  None.      ACCESSION NUMBER(S):  [id number]      ORDERING CLINICIAN:  EVA STRICKLAND      TECHNIQUE:  Multiple ultrasonographic images of the thyroid gland and surrounding  tissues were obtained.      FINDINGS:  PARENCHYMA:      SIZE:  RIGHT LOBE:  3.7 x 1.2 x 1.2 cm; homogeneous echotexture with normal vascularity.  LEFT LOBE:  3.6 x 1.2 x 1.4 cm; homogeneous echotexture with normal vascularity  ISTHMUS:  0.2 cm      No discrete nodules identified. Additionally, in the left clavicular  region and described area of palpable abnormality, no mass or  adenopathy is noted.      CERVICAL LYMPH NODES:  No enlarged or morphologically abnormal cervical nodes are seen.        Impression    1. Unremarkable thyroid ultrasound.      Please note that these statements are based on the recommendations of  the American College of Radiology      TI-RADS grading system. ACR TI-RADS recommendations (apply to nodules  which have NOT been biopsied):      TR5 (7 or more points) highly suspicious - FNA if ? 1cm, follow-up if  0.5 -0.9 cm every year for 5 years. Aggregate cancer risk 35%. TR4  (4-6 points) moderately suspicious - FNA if ? 1.5cm, follow-up if 1  -1.4 cm in 1, 2, 3 and 5 years. Aggregate cancer risk 9.1% TR3 (3  points) mildly suspicious - FNA if ? 2.5cm, follow-up if 1.5 -2.4 cm  in 1, 3 and 5 years. Aggregate cancer risk 4.8% TR2 (2 points) not  suspicious. No FNA or follow-up. Aggregate cancer risk 1.5% TR1 (0  points) benign - No FNA or follow-up. Aggregate cancer risk 0.3%      Signed by: Chan Trevino 6/25/2025 1:15 PM  Dictation workstation:   [id number]       4:31 PM      Results were successfully communicated with the patient and they acknowledged their understanding.  Patient does  report that she remembers she did fall on her trip and she did catch herself with the left arm. Reassured her there was no fracture in the clavicle. Recommend icing and to follow up with Dr. Ambriz if symptoms persist.

## 2025-06-25 NOTE — TELEPHONE ENCOUNTER
Pt called- had US thyroid results in [facility] in [place]. Pt does not use [facility] and would like a call with results when available. Please advise

## 2025-07-16 ENCOUNTER — HOSPITAL ENCOUNTER (OUTPATIENT)
Dept: RADIOLOGY | Facility: CLINIC | Age: 70
Discharge: HOME | End: 2025-07-16
Payer: MEDICARE

## 2025-07-16 ENCOUNTER — APPOINTMENT (OUTPATIENT)
Dept: PRIMARY CARE | Facility: CLINIC | Age: 70
End: 2025-07-16
Payer: MEDICARE

## 2025-07-16 VITALS
SYSTOLIC BLOOD PRESSURE: 138 MMHG | WEIGHT: 123.8 LBS | RESPIRATION RATE: 16 BRPM | HEART RATE: 62 BPM | TEMPERATURE: 97.6 F | HEIGHT: 65 IN | DIASTOLIC BLOOD PRESSURE: 70 MMHG | BODY MASS INDEX: 20.62 KG/M2 | OXYGEN SATURATION: 97 %

## 2025-07-16 DIAGNOSIS — R74.8 ELEVATED LIVER ENZYMES: Primary | ICD-10-CM

## 2025-07-16 DIAGNOSIS — E55.9 VITAMIN D DEFICIENCY: ICD-10-CM

## 2025-07-16 DIAGNOSIS — M54.2 NECK PAIN: ICD-10-CM

## 2025-07-16 DIAGNOSIS — E78.2 MIXED HYPERLIPIDEMIA: ICD-10-CM

## 2025-07-16 DIAGNOSIS — I10 PRIMARY HYPERTENSION: ICD-10-CM

## 2025-07-16 DIAGNOSIS — F41.9 ANXIETY DISORDER, UNSPECIFIED TYPE: ICD-10-CM

## 2025-07-16 DIAGNOSIS — M81.0 AGE-RELATED OSTEOPOROSIS WITHOUT CURRENT PATHOLOGICAL FRACTURE: ICD-10-CM

## 2025-07-16 PROCEDURE — 1159F MED LIST DOCD IN RCRD: CPT | Performed by: INTERNAL MEDICINE

## 2025-07-16 PROCEDURE — 1125F AMNT PAIN NOTED PAIN PRSNT: CPT | Performed by: INTERNAL MEDICINE

## 2025-07-16 PROCEDURE — 99214 OFFICE O/P EST MOD 30 MIN: CPT | Performed by: INTERNAL MEDICINE

## 2025-07-16 PROCEDURE — 1036F TOBACCO NON-USER: CPT | Performed by: INTERNAL MEDICINE

## 2025-07-16 PROCEDURE — 72040 X-RAY EXAM NECK SPINE 2-3 VW: CPT

## 2025-07-16 PROCEDURE — 72040 X-RAY EXAM NECK SPINE 2-3 VW: CPT | Performed by: RADIOLOGY

## 2025-07-16 PROCEDURE — 3008F BODY MASS INDEX DOCD: CPT | Performed by: INTERNAL MEDICINE

## 2025-07-16 PROCEDURE — G2211 COMPLEX E/M VISIT ADD ON: HCPCS | Performed by: INTERNAL MEDICINE

## 2025-07-16 PROCEDURE — 1160F RVW MEDS BY RX/DR IN RCRD: CPT | Performed by: INTERNAL MEDICINE

## 2025-07-16 PROCEDURE — 3078F DIAST BP <80 MM HG: CPT | Performed by: INTERNAL MEDICINE

## 2025-07-16 PROCEDURE — 3075F SYST BP GE 130 - 139MM HG: CPT | Performed by: INTERNAL MEDICINE

## 2025-07-16 RX ORDER — ALPRAZOLAM 0.25 MG/1
0.25 TABLET ORAL 3 TIMES DAILY PRN
Qty: 6 TABLET | Refills: 0 | Status: SHIPPED | OUTPATIENT
Start: 2025-07-16 | End: 2025-07-22

## 2025-07-16 ASSESSMENT — ENCOUNTER SYMPTOMS
DIARRHEA: 0
VOICE CHANGE: 0
HALLUCINATIONS: 0
SHORTNESS OF BREATH: 0
UNEXPECTED WEIGHT CHANGE: 0
FLANK PAIN: 0
CHEST TIGHTNESS: 0
AGITATION: 0
SINUS PRESSURE: 0
ABDOMINAL PAIN: 0
SINUS PAIN: 0
NECK PAIN: 1
EYE DISCHARGE: 0
BACK PAIN: 0
SPEECH DIFFICULTY: 0
LIGHT-HEADEDNESS: 0
TREMORS: 0
CONSTITUTIONAL NEGATIVE: 1
VOMITING: 0
BRUISES/BLEEDS EASILY: 0
EYE REDNESS: 0
ACTIVITY CHANGE: 0
EYES NEGATIVE: 1
NEUROLOGICAL NEGATIVE: 1
APPETITE CHANGE: 0
HEADACHES: 0
COLOR CHANGE: 0
JOINT SWELLING: 0
NUMBNESS: 0
WHEEZING: 0
CONSTIPATION: 0
TROUBLE SWALLOWING: 0
DIZZINESS: 0
FREQUENCY: 0
GASTROINTESTINAL NEGATIVE: 1
ADENOPATHY: 0
RESPIRATORY NEGATIVE: 1
COUGH: 0
EYE PAIN: 0
SLEEP DISTURBANCE: 0
WOUND: 0
CARDIOVASCULAR NEGATIVE: 1
DYSURIA: 0
POLYPHAGIA: 0
PALPITATIONS: 0
BLOOD IN STOOL: 0
MYALGIAS: 0
CONFUSION: 0
NERVOUS/ANXIOUS: 1
SORE THROAT: 0
WEAKNESS: 0
NECK STIFFNESS: 0
DIFFICULTY URINATING: 0
SEIZURES: 0
ENDOCRINE NEGATIVE: 1
ALLERGIC/IMMUNOLOGIC NEGATIVE: 1
POLYDIPSIA: 0
STRIDOR: 0
ARTHRALGIAS: 1
NAUSEA: 0
HEMATOLOGIC/LYMPHATIC NEGATIVE: 1
FACIAL ASYMMETRY: 0

## 2025-07-16 ASSESSMENT — PAIN SCALES - GENERAL: PAINLEVEL_OUTOF10: 4

## 2025-07-16 NOTE — ASSESSMENT & PLAN NOTE
Please maintain enough calcium in your diet:  8043-8813 mg daily (consume foods rich in calcium rather than taking only calcium supplement to meet daily calcium requirements), take daily Vitamin D supplement with meal. Average Vit D dose is 2000 international units daily.  Weight bearing exercise routine is recommended.   Not able to tolerate Boniva, saw  rheumatologist for further evaluation and treatment recommendations.
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
## 2025-07-16 NOTE — ASSESSMENT & PLAN NOTE
Continue  Losartan 100 mg daily, monitor BP at home, let me know if consistently above 140/90.

## 2025-07-17 LAB
ALBUMIN SERPL-MCNC: 4.4 G/DL (ref 3.6–5.1)
ALP SERPL-CCNC: 99 U/L (ref 37–153)
ALT SERPL-CCNC: 40 U/L (ref 6–29)
ANION GAP SERPL CALCULATED.4IONS-SCNC: 11 MMOL/L (CALC) (ref 7–17)
AST SERPL-CCNC: 29 U/L (ref 10–35)
BILIRUB SERPL-MCNC: 0.4 MG/DL (ref 0.2–1.2)
BUN SERPL-MCNC: 20 MG/DL (ref 7–25)
CALCIUM SERPL-MCNC: 9.2 MG/DL (ref 8.6–10.4)
CHLORIDE SERPL-SCNC: 104 MMOL/L (ref 98–110)
CO2 SERPL-SCNC: 23 MMOL/L (ref 20–32)
CREAT SERPL-MCNC: 0.69 MG/DL (ref 0.5–1.05)
EGFRCR SERPLBLD CKD-EPI 2021: 94 ML/MIN/1.73M2
GLUCOSE SERPL-MCNC: 82 MG/DL (ref 65–99)
POTASSIUM SERPL-SCNC: 5.1 MMOL/L (ref 3.5–5.3)
PROT SERPL-MCNC: 6.9 G/DL (ref 6.1–8.1)
SODIUM SERPL-SCNC: 138 MMOL/L (ref 135–146)

## 2025-07-18 ENCOUNTER — TELEPHONE (OUTPATIENT)
Dept: PRIMARY CARE | Facility: CLINIC | Age: 70
End: 2025-07-18
Payer: MEDICARE

## 2025-07-18 DIAGNOSIS — R79.89 ELEVATED LFTS: Primary | ICD-10-CM

## 2025-07-18 NOTE — TELEPHONE ENCOUNTER
Pt called stated doesn't have MyChart and was wonder results of blood work? Pt stated it was done on Wednesday.    Please call pt at  [phone]

## 2025-07-22 ENCOUNTER — TELEPHONE (OUTPATIENT)
Dept: PRIMARY CARE | Facility: CLINIC | Age: 70
End: 2025-07-22
Payer: MEDICARE

## 2025-07-22 DIAGNOSIS — M54.2 NECK PAIN: Primary | ICD-10-CM

## 2025-07-22 NOTE — TELEPHONE ENCOUNTER
Patient would like to pursue getting MRI. Patient would like three areas tested: neck, left shoulder and clavicle. Patient would also like the results of her X-rays that were done as well.

## 2025-07-28 ENCOUNTER — HOSPITAL ENCOUNTER (OUTPATIENT)
Dept: RADIOLOGY | Facility: CLINIC | Age: 70
Discharge: HOME | End: 2025-07-28
Payer: MEDICARE

## 2025-07-28 ENCOUNTER — OFFICE VISIT (OUTPATIENT)
Facility: CLINIC | Age: 70
End: 2025-07-28
Payer: MEDICARE

## 2025-07-28 VITALS
HEIGHT: 65 IN | BODY MASS INDEX: 20.49 KG/M2 | SYSTOLIC BLOOD PRESSURE: 142 MMHG | WEIGHT: 123 LBS | HEART RATE: 60 BPM | RESPIRATION RATE: 14 BRPM | TEMPERATURE: 98 F | DIASTOLIC BLOOD PRESSURE: 90 MMHG

## 2025-07-28 DIAGNOSIS — M54.2 NECK PAIN: ICD-10-CM

## 2025-07-28 DIAGNOSIS — M62.838 MUSCLE SPASM: ICD-10-CM

## 2025-07-28 DIAGNOSIS — M54.12 CERVICAL RADICULOPATHY: ICD-10-CM

## 2025-07-28 DIAGNOSIS — M54.12 CERVICAL RADICULOPATHY: Primary | ICD-10-CM

## 2025-07-28 PROCEDURE — 1159F MED LIST DOCD IN RCRD: CPT | Performed by: NURSE PRACTITIONER

## 2025-07-28 PROCEDURE — 1036F TOBACCO NON-USER: CPT | Performed by: NURSE PRACTITIONER

## 2025-07-28 PROCEDURE — 99214 OFFICE O/P EST MOD 30 MIN: CPT | Performed by: NURSE PRACTITIONER

## 2025-07-28 PROCEDURE — 3080F DIAST BP >= 90 MM HG: CPT | Performed by: NURSE PRACTITIONER

## 2025-07-28 PROCEDURE — 99204 OFFICE O/P NEW MOD 45 MIN: CPT | Performed by: NURSE PRACTITIONER

## 2025-07-28 PROCEDURE — 1125F AMNT PAIN NOTED PAIN PRSNT: CPT | Performed by: NURSE PRACTITIONER

## 2025-07-28 PROCEDURE — 3008F BODY MASS INDEX DOCD: CPT | Performed by: NURSE PRACTITIONER

## 2025-07-28 PROCEDURE — 72040 X-RAY EXAM NECK SPINE 2-3 VW: CPT

## 2025-07-28 PROCEDURE — 72040 X-RAY EXAM NECK SPINE 2-3 VW: CPT | Performed by: RADIOLOGY

## 2025-07-28 PROCEDURE — 3077F SYST BP >= 140 MM HG: CPT | Performed by: NURSE PRACTITIONER

## 2025-07-28 RX ORDER — METHOCARBAMOL 500 MG/1
500 TABLET, FILM COATED ORAL 3 TIMES DAILY PRN
Qty: 60 TABLET | Refills: 0 | Status: SHIPPED | OUTPATIENT
Start: 2025-07-28 | End: 2025-08-27

## 2025-07-28 RX ORDER — METHYLPREDNISOLONE 4 MG/1
TABLET ORAL
Qty: 21 TABLET | Refills: 0 | Status: SHIPPED | OUTPATIENT
Start: 2025-07-28 | End: 2025-08-03

## 2025-07-28 ASSESSMENT — PATIENT HEALTH QUESTIONNAIRE - PHQ9
2. FEELING DOWN, DEPRESSED OR HOPELESS: SEVERAL DAYS
SUM OF ALL RESPONSES TO PHQ9 QUESTIONS 1 AND 2: 1
1. LITTLE INTEREST OR PLEASURE IN DOING THINGS: NOT AT ALL

## 2025-07-28 ASSESSMENT — PAIN SCALES - GENERAL: PAINLEVEL_OUTOF10: 8

## 2025-07-28 NOTE — PROGRESS NOTES
"[facility]  Department of Neurological Surgery  New Patient Visit        It was a pleasure to see Cydney Lin on 7/28/2025. She is a 69 y.o. , left-handed female who presents to the [facility] for evaluation of neck and left arm pain. Patient is referred by Verena Montiel M*. PMH is significant for HTN, HLD, heart murmur, PTSD, and anxiety.     Cydney Lin has had symptoms of severe neck and left arm pain since she suffered a fall about 6 weeks ago while camping in the [facility].  She reports she was walking at night and tripped. She fell forward and her  grabbed her to help brace the fall. She did not hit her head, but she \"kendall\" her neck during this fall. She did not require any further evaluation at that time and traveled home. Neck pain has persisted since that time so she followed up with her PCP for further evaluation. She also noted a new nodule on the front of her left clavicle. She underwent X-rays of the cervical spine 7/16/25 ordered by PCP that demonstrate degenerative changes including some disc space narrowing, endplate spurring, and posterior disc osteophyte complexes. Progression of symptoms prompted today's visit.   With regards to pain, it is constant and burning in nature. Pain radiates up the neck into the base of the skull and into the jaw, as well as into the left shoulder and down the anterior left arm into the wrist. She denies any right sided symptoms. She endorses paresthesias throughout the left arm.  She reports difficulty sleeping due to the pain. Laying down makes the pain worse. Thus far, she has tried Aleve with only temporary relief in pain. She denies any imbalance, difficulty dressing, or difficulty holding / opening objects.       Previous Spine Surgery:   Lumbar fusion surgery in 2007   Re-do lumbar fusion in 2009, [facility]    Smoker: Denies   Anticoagulation / " Antiplatelets: None     ROS: 12 / 12 systems reviewed and are negative unless noted in HPI    ON EXAM:  General: Well developed female, awake/alert/oriented x 3, no distress, alert and cooperative. Speech clear.   Skin: Warm and dry, no visible lesions / rashes  ENMT: Mucous membranes moist, no apparent injury  Head/Neck: No apparent injury  Respiratory/Thorax: Normal breathing with good chest expansion, thorax symmetric  Cardiovascular: No pitting edema, no JVD    NEUROLOGICAL EXAM:  EOMI, face symmetric  Motor Strength: 5/5 strength throughout  Range of Motion:  Normal in all extremities  Muscle Tone: Normal without spasticity or contractures in all extremities  Muscle Bulk: Normal and symmetric in all extremities  Posture:  -- Cervical: Normal  -- Thoracic: Normal  -- Lumbar : Normal  Paraspinal muscle tenderness present in the left upper trapezius and cervical musculature.  No palpable tenderness along the spinous processes.  Sensation: soft touch sensation intact throughout  Gait: Normal      Cydney Lin has been seen as a new patient today for complaints of neck and left arm pain since a fall about 6 weeks ago. AP/LAT views of the cervical spine on 7/16/25 demonstrated degenerative changes including disc space narrowing, endplate spurring and posterior disc osteophyte complexes. Will obtain dynamic X-rays to rule out any instability. It is my medical opinion she should undergo MRI of the cervical spine to assess soft tissues and rule out any nerve impingement. Prescriptions for medrol dospak and Robaxin were provided today. I will follow up with her upon completion of imaging studies with further recommendations at that time. She is understanding and agreeable to this plan.         [place] [address] [address] [address] [place] [address]     Phone: [phone]  Fax: [phone]

## 2025-08-05 ENCOUNTER — OFFICE VISIT (OUTPATIENT)
Dept: URGENT CARE | Age: 70
End: 2025-08-05
Payer: MEDICARE

## 2025-08-05 VITALS
OXYGEN SATURATION: 99 % | DIASTOLIC BLOOD PRESSURE: 70 MMHG | TEMPERATURE: 97.7 F | HEART RATE: 61 BPM | RESPIRATION RATE: 18 BRPM | SYSTOLIC BLOOD PRESSURE: 155 MMHG

## 2025-08-05 DIAGNOSIS — R35.0 FREQUENT URINATION: ICD-10-CM

## 2025-08-05 DIAGNOSIS — N89.8 VAGINAL ITCHING: ICD-10-CM

## 2025-08-05 DIAGNOSIS — B37.31 YEAST INFECTION OF THE VAGINA: Primary | ICD-10-CM

## 2025-08-05 DIAGNOSIS — N94.89 VAGINAL BURNING: ICD-10-CM

## 2025-08-05 LAB
POC BILIRUBIN, URINE: NEGATIVE
POC BLOOD, URINE: NEGATIVE
POC GLUCOSE, URINE: NEGATIVE MG/DL
POC KETONES, URINE: NEGATIVE MG/DL
POC LEUKOCYTES, URINE: ABNORMAL
POC NITRITE,URINE: NEGATIVE
POC PH, URINE: 6 PH
POC PROTEIN, URINE: NEGATIVE MG/DL
POC SPECIFIC GRAVITY, URINE: 1.01
POC UROBILINOGEN, URINE: 0.2 EU/DL

## 2025-08-05 PROCEDURE — 99213 OFFICE O/P EST LOW 20 MIN: CPT | Performed by: NURSE PRACTITIONER

## 2025-08-05 PROCEDURE — 81003 URINALYSIS AUTO W/O SCOPE: CPT | Performed by: NURSE PRACTITIONER

## 2025-08-05 PROCEDURE — 3077F SYST BP >= 140 MM HG: CPT | Performed by: NURSE PRACTITIONER

## 2025-08-05 PROCEDURE — 1159F MED LIST DOCD IN RCRD: CPT | Performed by: NURSE PRACTITIONER

## 2025-08-05 PROCEDURE — 1160F RVW MEDS BY RX/DR IN RCRD: CPT | Performed by: NURSE PRACTITIONER

## 2025-08-05 PROCEDURE — 3078F DIAST BP <80 MM HG: CPT | Performed by: NURSE PRACTITIONER

## 2025-08-05 PROCEDURE — 1036F TOBACCO NON-USER: CPT | Performed by: NURSE PRACTITIONER

## 2025-08-05 RX ORDER — FLUCONAZOLE 150 MG/1
TABLET ORAL
Qty: 2 TABLET | Refills: 0 | Status: SHIPPED | OUTPATIENT
Start: 2025-08-05

## 2025-08-05 NOTE — PATIENT INSTRUCTIONS
Yeast Infection/Vaginal Irritation/Vaginal Itching:  - UA showed trace leukocytes; otherwise negative; will send C&S to make sure not UTI, if positive will send antibiotics for her  - Good oral hydration  - Most likely symptoms secondary to yeast infection brought on by steroid use; will treat with Diflucan, advised to use Vagasil for irritation and itching as well, but only externally  - Advised on s/s to seek emergent care  - Keep genital area clean and dry  - Advised on UTI prevention and yeast infection prevention

## 2025-08-05 NOTE — PROGRESS NOTES
Subjective   Patient ID: Cydney Lin is a 69 y.o. female. They present today with a chief complaint of frequent urination and burning with urination.    History of Present Illness  Pt presents to the  with the c/o vaginal irritation and itching. She is not diabetic, however she just finished steroid dose pack 3 days ago. No fever, no lower abd pain or back pain. Denies actual pain with urination, but irritation in the area constantly.           Past Medical History  Allergies as of 08/05/2025 - Reviewed 07/28/2025   Allergen Reaction Noted    Lisinopril Cough 09/11/2023    Adhesive tape-silicones Hives and Itching 07/16/2025    Vicodin [hydrocodone-acetaminophen] Rash 03/23/2023       Prescriptions Prior to Admission[1]     Medical History[2]    Surgical History[3]     reports that she has never smoked. She has never been exposed to tobacco smoke. She has never used smokeless tobacco. She reports that she does not currently use alcohol. She reports that she does not currently use drugs after having used the following drugs: Marijuana.    Review of Systems  Review of Systems     10 point ROS completed and all are negative other than what is stated in the current HPI                            Objective    Vitals:    08/05/25 1052   BP: 155/70   Pulse: 61   Resp: 18   Temp: 36.5 °C (97.7 °F)   SpO2: 99%     No LMP recorded. Patient has had a hysterectomy.    Physical Exam  Vitals and nursing note reviewed.   Constitutional:       Appearance: Normal appearance. She is not ill-appearing or toxic-appearing.     Cardiovascular:      Rate and Rhythm: Normal rate and regular rhythm.   Pulmonary:      Effort: Pulmonary effort is normal.      Breath sounds: Normal breath sounds.   Abdominal:      Palpations: Abdomen is soft.      Tenderness: There is no abdominal tenderness. There is no right CVA tenderness or left CVA tenderness.   Genitourinary:     Vagina: Vaginal discharge (white) present.      Comments:  Declined vaginal exam; advised (+) itching and irritation to the vaginal area; denies any other abnormalities at this time    Skin:     General: Skin is warm and dry.      Capillary Refill: Capillary refill takes less than 2 seconds.      Findings: No rash.     Neurological:      Mental Status: She is alert and oriented to person, place, and time.     Psychiatric:         Behavior: Behavior normal.         Procedures    Point of Care Test & Imaging Results from this visit  Results for orders placed or performed in visit on 08/05/25   POCT UA Automated manually resulted   Result Value Ref Range    POC Glucose, Urine NEGATIVE NEGATIVE mg/dl    POC Bilirubin, Urine NEGATIVE NEGATIVE    POC Ketones, Urine NEGATIVE NEGATIVE mg/dl    POC Specific Gravity, Urine 1.015 1.005 - 1.035    POC Blood, Urine NEGATIVE NEGATIVE    POC PH, Urine 6.0 No Reference Range Established PH    POC Protein, Urine NEGATIVE NEGATIVE mg/dl    POC Urobilinogen, Urine 0.2 0.2, 1.0 EU/DL    Poc Nitrite, Urine NEGATIVE NEGATIVE    POC Leukocytes, Urine SMALL (1+) (A) NEGATIVE      Imaging  No results found.    Cardiology, Vascular, and Other Imaging  No other imaging results found for the past 2 days      Diagnostic study results (if any) were reviewed by AAMIR Penn.    Assessment/Plan   Allergies, medications, history, and pertinent labs/EKGs/Imaging reviewed by AAMIR Penn.     Medical Decision Making  Yeast Infection/Vaginal Irritation/Vaginal Itching:  - UA showed trace leukocytes; otherwise negative; will send C&S to make sure not UTI, if positive will send antibiotics for her  - Good oral hydration  - Most likely symptoms secondary to yeast infection brought on by steroid use; will treat with Diflucan, advised to use Vagasil for irritation and itching as well, but only externally  - Advised on s/s to seek emergent care  - Keep genital area clean and dry  - Advised on UTI prevention and yeast infection  prevention    At time of discharge patient was clinically well-appearing and HDS for outpatient management. The patient and/or family was educated regarding diagnosis, supportive care, OTC and Rx medications. The patient and/or family was given the opportunity to ask questions prior to discharge.  They verbalized understanding of my discussion of the plans for treatment, expected course, indications to return to  or seek further evaluation in ED, and the need for timely follow up as directed.   They were provided with a work/school excuse if requested.  AVS provided to patient.  All questions were answered and the patient verbalized understanding of the plan of care for today.        Orders and Diagnoses  Diagnoses and all orders for this visit:  Yeast infection of the vagina  -     fluconazole (Diflucan) 150 mg tablet; Take one tablet by mouth x 1 now then take second tablet in the next 3 days  Vaginal burning  -     fluconazole (Diflucan) 150 mg tablet; Take one tablet by mouth x 1 now then take second tablet in the next 3 days  Vaginal itching  -     fluconazole (Diflucan) 150 mg tablet; Take one tablet by mouth x 1 now then take second tablet in the next 3 days  Frequent urination  -     POCT UA Automated manually resulted  -     Urine Culture      Medical Admin Record      Patient disposition: Home    Electronically signed by AAMIR Penn  12:36 PM           [1] (Not in a hospital admission)   [2]   Past Medical History:  Diagnosis Date    Hyperlipidemia     Personal history of urinary calculi 06/09/2021    History of nephrolithiasis   [3]   Past Surgical History:  Procedure Laterality Date    HYSTERECTOMY  2006    OTHER SURGICAL HISTORY  06/09/2021    Shoulder replacement    OTHER SURGICAL HISTORY  06/09/2021    Lower back surgery

## 2025-08-07 LAB — BACTERIA UR CULT: NORMAL

## 2025-08-15 ENCOUNTER — EVALUATION (OUTPATIENT)
Dept: PHYSICAL THERAPY | Facility: CLINIC | Age: 70
End: 2025-08-15
Payer: MEDICARE

## 2025-08-15 DIAGNOSIS — M54.2 NECK PAIN: Primary | ICD-10-CM

## 2025-08-15 PROCEDURE — 97110 THERAPEUTIC EXERCISES: CPT | Mod: GP | Performed by: PHYSICAL THERAPIST

## 2025-08-15 PROCEDURE — 97161 PT EVAL LOW COMPLEX 20 MIN: CPT | Mod: GP | Performed by: PHYSICAL THERAPIST

## 2025-08-16 ENCOUNTER — OFFICE VISIT (OUTPATIENT)
Dept: URGENT CARE | Age: 70
End: 2025-08-16
Payer: MEDICARE

## 2025-08-16 VITALS
SYSTOLIC BLOOD PRESSURE: 140 MMHG | RESPIRATION RATE: 18 BRPM | WEIGHT: 123 LBS | OXYGEN SATURATION: 99 % | TEMPERATURE: 97.4 F | DIASTOLIC BLOOD PRESSURE: 80 MMHG | BODY MASS INDEX: 20.79 KG/M2 | HEART RATE: 58 BPM

## 2025-08-16 DIAGNOSIS — N89.8 VAGINAL IRRITATION: Primary | ICD-10-CM

## 2025-08-16 RX ORDER — FLUCONAZOLE 150 MG/1
TABLET ORAL
Qty: 2 TABLET | Refills: 0 | Status: SHIPPED | OUTPATIENT
Start: 2025-08-16

## 2025-08-16 ASSESSMENT — ENCOUNTER SYMPTOMS
DYSURIA: 0
ABDOMINAL PAIN: 0

## 2025-08-17 LAB — BV SCORE VAG QL: ABNORMAL

## 2025-08-18 ENCOUNTER — HOSPITAL ENCOUNTER (OUTPATIENT)
Dept: RADIOLOGY | Facility: CLINIC | Age: 70
Discharge: HOME | End: 2025-08-18
Payer: MEDICARE

## 2025-08-18 DIAGNOSIS — M54.2 NECK PAIN: ICD-10-CM

## 2025-08-18 DIAGNOSIS — M54.12 CERVICAL RADICULOPATHY: ICD-10-CM

## 2025-08-18 PROCEDURE — 72141 MRI NECK SPINE W/O DYE: CPT

## 2025-08-18 PROCEDURE — 72141 MRI NECK SPINE W/O DYE: CPT | Performed by: RADIOLOGY

## 2025-08-20 DIAGNOSIS — N76.0 BV (BACTERIAL VAGINOSIS): Primary | ICD-10-CM

## 2025-08-20 DIAGNOSIS — B96.89 BV (BACTERIAL VAGINOSIS): Primary | ICD-10-CM

## 2025-08-20 RX ORDER — METRONIDAZOLE 500 MG/1
500 TABLET ORAL EVERY 12 HOURS
Qty: 14 TABLET | Refills: 0 | Status: SHIPPED | OUTPATIENT
Start: 2025-08-20 | End: 2025-08-27

## 2025-08-29 ENCOUNTER — OFFICE VISIT (OUTPATIENT)
Dept: PRIMARY CARE | Facility: CLINIC | Age: 70
End: 2025-08-29
Payer: MEDICARE

## 2025-08-29 ENCOUNTER — HOSPITAL ENCOUNTER (OUTPATIENT)
Dept: RADIOLOGY | Facility: CLINIC | Age: 70
Discharge: HOME | End: 2025-08-29
Payer: MEDICARE

## 2025-08-29 VITALS
DIASTOLIC BLOOD PRESSURE: 80 MMHG | HEART RATE: 68 BPM | HEIGHT: 65 IN | OXYGEN SATURATION: 98 % | BODY MASS INDEX: 20.79 KG/M2 | WEIGHT: 124.8 LBS | RESPIRATION RATE: 16 BRPM | SYSTOLIC BLOOD PRESSURE: 130 MMHG | TEMPERATURE: 98 F

## 2025-08-29 DIAGNOSIS — M79.672 ACUTE FOOT PAIN, LEFT: Primary | ICD-10-CM

## 2025-08-29 DIAGNOSIS — M79.672 ACUTE FOOT PAIN, LEFT: ICD-10-CM

## 2025-08-29 PROCEDURE — 1159F MED LIST DOCD IN RCRD: CPT | Performed by: NURSE PRACTITIONER

## 2025-08-29 PROCEDURE — 73630 X-RAY EXAM OF FOOT: CPT | Mod: LT

## 2025-08-29 PROCEDURE — 3079F DIAST BP 80-89 MM HG: CPT | Performed by: NURSE PRACTITIONER

## 2025-08-29 PROCEDURE — 1036F TOBACCO NON-USER: CPT | Performed by: NURSE PRACTITIONER

## 2025-08-29 PROCEDURE — 99213 OFFICE O/P EST LOW 20 MIN: CPT | Performed by: NURSE PRACTITIONER

## 2025-08-29 PROCEDURE — 3075F SYST BP GE 130 - 139MM HG: CPT | Performed by: NURSE PRACTITIONER

## 2025-08-29 PROCEDURE — 3008F BODY MASS INDEX DOCD: CPT | Performed by: NURSE PRACTITIONER

## 2025-08-29 ASSESSMENT — ENCOUNTER SYMPTOMS
MYALGIAS: 0
NECK STIFFNESS: 0
BACK PAIN: 0
NECK PAIN: 0
JOINT SWELLING: 0
ARTHRALGIAS: 1

## 2025-08-29 ASSESSMENT — PATIENT HEALTH QUESTIONNAIRE - PHQ9
1. LITTLE INTEREST OR PLEASURE IN DOING THINGS: NOT AT ALL
2. FEELING DOWN, DEPRESSED OR HOPELESS: NOT AT ALL
SUM OF ALL RESPONSES TO PHQ9 QUESTIONS 1 AND 2: 0

## 2025-09-02 ENCOUNTER — TELEPHONE (OUTPATIENT)
Dept: UROLOGY | Facility: CLINIC | Age: 70
End: 2025-09-02
Payer: MEDICARE

## 2025-09-04 ENCOUNTER — RESULTS FOLLOW-UP (OUTPATIENT)
Dept: NEUROSURGERY | Facility: CLINIC | Age: 70
End: 2025-09-04
Payer: MEDICARE

## 2025-09-11 ENCOUNTER — APPOINTMENT (OUTPATIENT)
Dept: PODIATRY | Facility: CLINIC | Age: 70
End: 2025-09-11
Payer: MEDICARE

## 2026-01-02 ENCOUNTER — APPOINTMENT (OUTPATIENT)
Dept: UROLOGY | Facility: CLINIC | Age: 71
End: 2026-01-02
Payer: MEDICARE